# Patient Record
Sex: MALE | Race: BLACK OR AFRICAN AMERICAN | NOT HISPANIC OR LATINO | URBAN - METROPOLITAN AREA
[De-identification: names, ages, dates, MRNs, and addresses within clinical notes are randomized per-mention and may not be internally consistent; named-entity substitution may affect disease eponyms.]

---

## 2020-12-27 ENCOUNTER — INPATIENT (INPATIENT)
Facility: HOSPITAL | Age: 50
LOS: 9 days | Discharge: HOME | DRG: 871 | End: 2021-01-06
Attending: HOSPITALIST | Admitting: INTERNAL MEDICINE
Payer: MEDICAID

## 2020-12-27 VITALS
OXYGEN SATURATION: 88 % | DIASTOLIC BLOOD PRESSURE: 102 MMHG | RESPIRATION RATE: 38 BRPM | HEART RATE: 124 BPM | SYSTOLIC BLOOD PRESSURE: 140 MMHG | TEMPERATURE: 100 F

## 2020-12-27 DIAGNOSIS — R09.89 OTHER SPECIFIED SYMPTOMS AND SIGNS INVOLVING THE CIRCULATORY AND RESPIRATORY SYSTEMS: ICD-10-CM

## 2020-12-27 DIAGNOSIS — J96.01 ACUTE RESPIRATORY FAILURE WITH HYPOXIA: ICD-10-CM

## 2020-12-27 LAB
ALBUMIN SERPL ELPH-MCNC: 3.1 G/DL — LOW (ref 3.3–5.2)
ALP SERPL-CCNC: 130 U/L — HIGH (ref 40–120)
ALT FLD-CCNC: 67 U/L — HIGH
ANION GAP SERPL CALC-SCNC: 13 MMOL/L — SIGNIFICANT CHANGE UP (ref 5–17)
APTT BLD: 36.6 SEC — HIGH (ref 27.5–35.5)
AST SERPL-CCNC: 63 U/L — HIGH
BASOPHILS # BLD AUTO: 0.07 K/UL — SIGNIFICANT CHANGE UP (ref 0–0.2)
BASOPHILS NFR BLD AUTO: 0.5 % — SIGNIFICANT CHANGE UP (ref 0–2)
BILIRUB SERPL-MCNC: 1.2 MG/DL — SIGNIFICANT CHANGE UP (ref 0.4–2)
BUN SERPL-MCNC: 22 MG/DL — HIGH (ref 8–20)
CALCIUM SERPL-MCNC: 8.4 MG/DL — LOW (ref 8.6–10.2)
CHLORIDE SERPL-SCNC: 94 MMOL/L — LOW (ref 98–107)
CO2 SERPL-SCNC: 24 MMOL/L — SIGNIFICANT CHANGE UP (ref 22–29)
CREAT SERPL-MCNC: 1.23 MG/DL — SIGNIFICANT CHANGE UP (ref 0.5–1.3)
CRP SERPL-MCNC: 30.97 MG/DL — HIGH (ref 0–0.4)
D DIMER BLD IA.RAPID-MCNC: HIGH NG/ML DDU
EOSINOPHIL # BLD AUTO: 0.01 K/UL — SIGNIFICANT CHANGE UP (ref 0–0.5)
EOSINOPHIL NFR BLD AUTO: 0.1 % — SIGNIFICANT CHANGE UP (ref 0–6)
FERRITIN SERPL-MCNC: 839 NG/ML — HIGH (ref 30–400)
GLUCOSE SERPL-MCNC: 289 MG/DL — HIGH (ref 70–99)
HCT VFR BLD CALC: 40.1 % — SIGNIFICANT CHANGE UP (ref 39–50)
HGB BLD-MCNC: 13.4 G/DL — SIGNIFICANT CHANGE UP (ref 13–17)
IMM GRANULOCYTES NFR BLD AUTO: 2 % — HIGH (ref 0–1.5)
INR BLD: 1.61 RATIO — HIGH (ref 0.88–1.16)
LYMPHOCYTES # BLD AUTO: 0.98 K/UL — LOW (ref 1–3.3)
LYMPHOCYTES # BLD AUTO: 6.7 % — LOW (ref 13–44)
MCHC RBC-ENTMCNC: 30.2 PG — SIGNIFICANT CHANGE UP (ref 27–34)
MCHC RBC-ENTMCNC: 33.4 GM/DL — SIGNIFICANT CHANGE UP (ref 32–36)
MCV RBC AUTO: 90.5 FL — SIGNIFICANT CHANGE UP (ref 80–100)
MONOCYTES # BLD AUTO: 0.8 K/UL — SIGNIFICANT CHANGE UP (ref 0–0.9)
MONOCYTES NFR BLD AUTO: 5.5 % — SIGNIFICANT CHANGE UP (ref 2–14)
NEUTROPHILS # BLD AUTO: 12.37 K/UL — HIGH (ref 1.8–7.4)
NEUTROPHILS NFR BLD AUTO: 85.2 % — HIGH (ref 43–77)
PLATELET # BLD AUTO: 234 K/UL — SIGNIFICANT CHANGE UP (ref 150–400)
POTASSIUM SERPL-MCNC: 4.5 MMOL/L — SIGNIFICANT CHANGE UP (ref 3.5–5.3)
POTASSIUM SERPL-SCNC: 4.5 MMOL/L — SIGNIFICANT CHANGE UP (ref 3.5–5.3)
PROCALCITONIN SERPL-MCNC: 1.86 NG/ML — HIGH (ref 0.02–0.1)
PROT SERPL-MCNC: 7.4 G/DL — SIGNIFICANT CHANGE UP (ref 6.6–8.7)
PROTHROM AB SERPL-ACNC: 18.3 SEC — HIGH (ref 10.6–13.6)
RBC # BLD: 4.43 M/UL — SIGNIFICANT CHANGE UP (ref 4.2–5.8)
RBC # FLD: 13.5 % — SIGNIFICANT CHANGE UP (ref 10.3–14.5)
SARS-COV-2 RNA SPEC QL NAA+PROBE: DETECTED
SODIUM SERPL-SCNC: 131 MMOL/L — LOW (ref 135–145)
WBC # BLD: 14.59 K/UL — HIGH (ref 3.8–10.5)
WBC # FLD AUTO: 14.59 K/UL — HIGH (ref 3.8–10.5)

## 2020-12-27 PROCEDURE — 71045 X-RAY EXAM CHEST 1 VIEW: CPT | Mod: 26

## 2020-12-27 PROCEDURE — 93970 EXTREMITY STUDY: CPT | Mod: 26

## 2020-12-27 PROCEDURE — 99223 1ST HOSP IP/OBS HIGH 75: CPT

## 2020-12-27 PROCEDURE — 99291 CRITICAL CARE FIRST HOUR: CPT

## 2020-12-27 PROCEDURE — 93010 ELECTROCARDIOGRAM REPORT: CPT

## 2020-12-27 RX ORDER — DEXAMETHASONE 0.5 MG/5ML
6 ELIXIR ORAL DAILY
Refills: 0 | Status: COMPLETED | OUTPATIENT
Start: 2020-12-27 | End: 2021-01-06

## 2020-12-27 RX ORDER — ENOXAPARIN SODIUM 100 MG/ML
40 INJECTION SUBCUTANEOUS
Refills: 0 | Status: DISCONTINUED | OUTPATIENT
Start: 2020-12-27 | End: 2020-12-27

## 2020-12-27 RX ORDER — CEFTRIAXONE 500 MG/1
1000 INJECTION, POWDER, FOR SOLUTION INTRAMUSCULAR; INTRAVENOUS EVERY 24 HOURS
Refills: 0 | Status: DISCONTINUED | OUTPATIENT
Start: 2020-12-27 | End: 2020-12-27

## 2020-12-27 RX ORDER — DEXAMETHASONE 0.5 MG/5ML
6 ELIXIR ORAL ONCE
Refills: 0 | Status: COMPLETED | OUTPATIENT
Start: 2020-12-27 | End: 2020-12-27

## 2020-12-27 RX ORDER — REMDESIVIR 5 MG/ML
200 INJECTION INTRAVENOUS EVERY 24 HOURS
Refills: 0 | Status: COMPLETED | OUTPATIENT
Start: 2020-12-27 | End: 2020-12-27

## 2020-12-27 RX ORDER — AZITHROMYCIN 500 MG/1
500 TABLET, FILM COATED ORAL EVERY 24 HOURS
Refills: 0 | Status: DISCONTINUED | OUTPATIENT
Start: 2020-12-27 | End: 2020-12-27

## 2020-12-27 RX ORDER — ACETAMINOPHEN 500 MG
975 TABLET ORAL ONCE
Refills: 0 | Status: COMPLETED | OUTPATIENT
Start: 2020-12-27 | End: 2020-12-27

## 2020-12-27 RX ORDER — MORPHINE SULFATE 50 MG/1
2 CAPSULE, EXTENDED RELEASE ORAL ONCE
Refills: 0 | Status: DISCONTINUED | OUTPATIENT
Start: 2020-12-27 | End: 2020-12-27

## 2020-12-27 RX ORDER — REMDESIVIR 5 MG/ML
100 INJECTION INTRAVENOUS EVERY 24 HOURS
Refills: 0 | Status: COMPLETED | OUTPATIENT
Start: 2020-12-28 | End: 2020-12-31

## 2020-12-27 RX ORDER — CHLORHEXIDINE GLUCONATE 213 G/1000ML
1 SOLUTION TOPICAL
Refills: 0 | Status: DISCONTINUED | OUTPATIENT
Start: 2020-12-27 | End: 2021-01-01

## 2020-12-27 RX ORDER — ENOXAPARIN SODIUM 100 MG/ML
100 INJECTION SUBCUTANEOUS EVERY 12 HOURS
Refills: 0 | Status: DISCONTINUED | OUTPATIENT
Start: 2020-12-27 | End: 2021-01-05

## 2020-12-27 RX ORDER — REMDESIVIR 5 MG/ML
INJECTION INTRAVENOUS
Refills: 0 | Status: COMPLETED | OUTPATIENT
Start: 2020-12-27 | End: 2020-12-31

## 2020-12-27 RX ADMIN — CEFTRIAXONE 100 MILLIGRAM(S): 500 INJECTION, POWDER, FOR SOLUTION INTRAMUSCULAR; INTRAVENOUS at 17:29

## 2020-12-27 RX ADMIN — Medication 975 MILLIGRAM(S): at 14:52

## 2020-12-27 RX ADMIN — ENOXAPARIN SODIUM 40 MILLIGRAM(S): 100 INJECTION SUBCUTANEOUS at 17:29

## 2020-12-27 RX ADMIN — Medication 6 MILLIGRAM(S): at 14:52

## 2020-12-27 RX ADMIN — Medication 975 MILLIGRAM(S): at 17:29

## 2020-12-27 RX ADMIN — MORPHINE SULFATE 2 MILLIGRAM(S): 50 CAPSULE, EXTENDED RELEASE ORAL at 17:44

## 2020-12-27 RX ADMIN — REMDESIVIR 200 MILLIGRAM(S): 5 INJECTION INTRAVENOUS at 17:58

## 2020-12-27 NOTE — ED PROVIDER NOTE - CARE PLAN
Principal Discharge DX:	Acute respiratory failure with hypoxia  Secondary Diagnosis:	Multilobar lung infiltrate

## 2020-12-27 NOTE — H&P ADULT - NSHPSOCIALHISTORY_GEN_ALL_CORE
Lives with mother, takes care of her (she has dementia).    Denies ETOH, smoking, or illicit drug use.  Has one living brother with mental disability, older brother .  No wife or children.  Father .  Works as illustrator.

## 2020-12-27 NOTE — H&P ADULT - NSHPLABSRESULTS_GEN_ALL_CORE
13.4   14.59 )-----------( 234      ( 27 Dec 2020 15:01 )             40.1   12-27    131<L>  |  94<L>  |  22.0<H>  ----------------------------<  289<H>  4.5   |  24.0  |  1.23    Ca    8.4<L>      27 Dec 2020 15:01    TPro  7.4  /  Alb  3.1<L>  /  TBili  1.2  /  DBili  x   /  AST  63<H>  /  ALT  67<H>  /  AlkPhos  130<H>  12-27

## 2020-12-27 NOTE — ED PROVIDER NOTE - OBJECTIVE STATEMENT
50 year old male with no PMH presents with SOB. Pt reports that he has had 2 weeks of fatigue, muscle aches, mylagias, and cough, he states that over that time he has had progressively worsening SOB. He called EMS today, and when they arrived they found him to be saturating 68% on RA, only came to 88% on 15L NRB. he denies chest pain, LE edema, hemoptysis, abd pain, numbness, tingling, weakness.

## 2020-12-27 NOTE — ED PROVIDER NOTE - TOBACCO USE
Never smoker DISPLAY PLAN FREE TEXT DISPLAY PLAN FREE TEXT DISPLAY PLAN FREE TEXT DISPLAY PLAN FREE TEXT DISPLAY PLAN FREE TEXT DISPLAY PLAN FREE TEXT DISPLAY PLAN FREE TEXT DISPLAY PLAN FREE TEXT DISPLAY PLAN FREE TEXT DISPLAY PLAN FREE TEXT DISPLAY PLAN FREE TEXT DISPLAY PLAN FREE TEXT DISPLAY PLAN FREE TEXT DISPLAY PLAN FREE TEXT

## 2020-12-27 NOTE — H&P ADULT - NSHPPHYSICALEXAM_GEN_ALL_CORE
On bipap with 100% fio2  alert, very tachypneic, able to answer questions  diaphoretic  reg rhythm, tachy  bilat air entry, scattered rhonchi  abd obese, nontender  bilat mild edema

## 2020-12-27 NOTE — H&P ADULT - HISTORY OF PRESENT ILLNESS
49 yo obese male, without known PMH, presented to the ED with fever, shortness of breath and fatigue.  Found to be hypoxic, saturating 68% on room air, placed on BIPAP in ED.  CXR showed diffuse multifocal infiltrates.  Patient reports having symptoms for several days, worsening the day of presentation.

## 2020-12-27 NOTE — H&P ADULT - ASSESSMENT
Impression:  1) Acute respiratory failure  2) COVID19 viral pneumonia  3) Obesity  Patient saturating in mid 90s on bipap, however is still tachypneic, at high risk for requiring intubation.  Will admit to MICU.     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

## 2020-12-28 LAB
ALBUMIN SERPL ELPH-MCNC: 3.2 G/DL — LOW (ref 3.3–5.2)
ALP SERPL-CCNC: 126 U/L — HIGH (ref 40–120)
ALT FLD-CCNC: 57 U/L — HIGH
ANION GAP SERPL CALC-SCNC: 14 MMOL/L — SIGNIFICANT CHANGE UP (ref 5–17)
AST SERPL-CCNC: 32 U/L — SIGNIFICANT CHANGE UP
BILIRUB DIRECT SERPL-MCNC: 0.2 MG/DL — SIGNIFICANT CHANGE UP (ref 0–0.3)
BILIRUB INDIRECT FLD-MCNC: 0.4 MG/DL — SIGNIFICANT CHANGE UP (ref 0.2–1)
BILIRUB SERPL-MCNC: 0.7 MG/DL — SIGNIFICANT CHANGE UP (ref 0.4–2)
BUN SERPL-MCNC: 33 MG/DL — HIGH (ref 8–20)
CALCIUM SERPL-MCNC: 8.7 MG/DL — SIGNIFICANT CHANGE UP (ref 8.6–10.2)
CHLORIDE SERPL-SCNC: 102 MMOL/L — SIGNIFICANT CHANGE UP (ref 98–107)
CO2 SERPL-SCNC: 25 MMOL/L — SIGNIFICANT CHANGE UP (ref 22–29)
CREAT SERPL-MCNC: 0.93 MG/DL — SIGNIFICANT CHANGE UP (ref 0.5–1.3)
CREAT SERPL-MCNC: 0.96 MG/DL — SIGNIFICANT CHANGE UP (ref 0.5–1.3)
CRP SERPL-MCNC: 34.44 MG/DL — HIGH (ref 0–0.4)
FERRITIN SERPL-MCNC: 962 NG/ML — HIGH (ref 30–400)
GLUCOSE BLDC GLUCOMTR-MCNC: 227 MG/DL — HIGH (ref 70–99)
GLUCOSE BLDC GLUCOMTR-MCNC: 239 MG/DL — HIGH (ref 70–99)
GLUCOSE BLDC GLUCOMTR-MCNC: 312 MG/DL — HIGH (ref 70–99)
GLUCOSE BLDC GLUCOMTR-MCNC: 314 MG/DL — HIGH (ref 70–99)
GLUCOSE SERPL-MCNC: 276 MG/DL — HIGH (ref 70–99)
HCT VFR BLD CALC: 41.2 % — SIGNIFICANT CHANGE UP (ref 39–50)
HGB BLD-MCNC: 14.1 G/DL — SIGNIFICANT CHANGE UP (ref 13–17)
LEGIONELLA AG UR QL: NEGATIVE — SIGNIFICANT CHANGE UP
MAGNESIUM SERPL-MCNC: 3 MG/DL — HIGH (ref 1.6–2.6)
MCHC RBC-ENTMCNC: 30.9 PG — SIGNIFICANT CHANGE UP (ref 27–34)
MCHC RBC-ENTMCNC: 34.2 GM/DL — SIGNIFICANT CHANGE UP (ref 32–36)
MCV RBC AUTO: 90.2 FL — SIGNIFICANT CHANGE UP (ref 80–100)
PHOSPHATE SERPL-MCNC: 3.5 MG/DL — SIGNIFICANT CHANGE UP (ref 2.4–4.7)
PLATELET # BLD AUTO: 236 K/UL — SIGNIFICANT CHANGE UP (ref 150–400)
POTASSIUM SERPL-MCNC: 4.8 MMOL/L — SIGNIFICANT CHANGE UP (ref 3.5–5.3)
POTASSIUM SERPL-SCNC: 4.8 MMOL/L — SIGNIFICANT CHANGE UP (ref 3.5–5.3)
PROCALCITONIN SERPL-MCNC: 2.34 NG/ML — HIGH (ref 0.02–0.1)
PROT SERPL-MCNC: 7.7 G/DL — SIGNIFICANT CHANGE UP (ref 6.6–8.7)
RBC # BLD: 4.57 M/UL — SIGNIFICANT CHANGE UP (ref 4.2–5.8)
RBC # FLD: 13.8 % — SIGNIFICANT CHANGE UP (ref 10.3–14.5)
SARS-COV-2 IGG SERPL QL IA: POSITIVE
SARS-COV-2 IGM SERPL IA-ACNC: 7.13 INDEX — HIGH
SODIUM SERPL-SCNC: 140 MMOL/L — SIGNIFICANT CHANGE UP (ref 135–145)
WBC # BLD: 14.73 K/UL — HIGH (ref 3.8–10.5)
WBC # FLD AUTO: 14.73 K/UL — HIGH (ref 3.8–10.5)

## 2020-12-28 PROCEDURE — 71275 CT ANGIOGRAPHY CHEST: CPT | Mod: 26

## 2020-12-28 PROCEDURE — 99223 1ST HOSP IP/OBS HIGH 75: CPT

## 2020-12-28 PROCEDURE — 99233 SBSQ HOSP IP/OBS HIGH 50: CPT

## 2020-12-28 RX ORDER — INSULIN GLARGINE 100 [IU]/ML
12 INJECTION, SOLUTION SUBCUTANEOUS AT BEDTIME
Refills: 0 | Status: DISCONTINUED | OUTPATIENT
Start: 2020-12-28 | End: 2020-12-29

## 2020-12-28 RX ORDER — INSULIN LISPRO 100/ML
VIAL (ML) SUBCUTANEOUS EVERY 6 HOURS
Refills: 0 | Status: DISCONTINUED | OUTPATIENT
Start: 2020-12-28 | End: 2020-12-29

## 2020-12-28 RX ADMIN — CHLORHEXIDINE GLUCONATE 1 APPLICATION(S): 213 SOLUTION TOPICAL at 06:23

## 2020-12-28 RX ADMIN — ENOXAPARIN SODIUM 100 MILLIGRAM(S): 100 INJECTION SUBCUTANEOUS at 10:08

## 2020-12-28 RX ADMIN — REMDESIVIR 500 MILLIGRAM(S): 5 INJECTION INTRAVENOUS at 18:32

## 2020-12-28 RX ADMIN — Medication 8: at 23:21

## 2020-12-28 RX ADMIN — INSULIN GLARGINE 12 UNIT(S): 100 INJECTION, SOLUTION SUBCUTANEOUS at 23:21

## 2020-12-28 RX ADMIN — ENOXAPARIN SODIUM 100 MILLIGRAM(S): 100 INJECTION SUBCUTANEOUS at 23:21

## 2020-12-28 RX ADMIN — Medication 4 MILLIGRAM(S): at 05:24

## 2020-12-28 RX ADMIN — Medication 8: at 18:32

## 2020-12-28 NOTE — CONSULT NOTE ADULT - SUBJECTIVE AND OBJECTIVE BOX
[facility]  INFECTIOUS DISEASES AND INTERNAL MEDICINE at [place]  =======================================================  Jamie Morales MD  Diplomates American Board of Internal Medicine and Infectious Diseases  Tel: [phone]      Fax: [phone]  =======================================================      [id number]  EDSON BELLA    CC: Patient is a 50y old  Male who presents with a chief complaint of respiratory failure (27 Dec 2020 16:54)      50y  Male with no PMH who presented to the ED with fever, shortness of breath and fatigue. Found to be hypoxic, saturating 68% on room air, placed on BIPAP in ED.  CXR showed diffuse multifocal infiltrates.  Patient reports having symptoms for several days, worsening the day of presentation. Patient with unknown sick contacts. Admitted to MICU. COVID 19 positive. ID input consulted.       Past Medical & Surgical Hx:  Obesity  No significant past surgical history      Social Hx:  Denies smoking       FAMILY HISTORY:  FH: senile dementia - Mother      Allergies  No Known Allergies       REVIEW OF SYSTEMS:  CONSTITUTIONAL:  No Fever or chills  HEENT:  No diplopia or blurred vision.  No earache, sore throat or runny nose.  CARDIOVASCULAR:  No pressure, squeezing, strangling, tightness, heaviness or aching about the chest, neck, axilla or epigastrium.  RESPIRATORY:  + cough, + shortness of breath  GASTROINTESTINAL:  No nausea, vomiting or diarrhea.  GENITOURINARY:  No dysuria, frequency or urgency.   MUSCULOSKELETAL:  no joint aches, no muscle pain  SKIN:  No change in skin, hair or nails.  NEUROLOGIC:  No Headaches, seizures   PSYCHIATRIC:  No disorder of thought or mood.  ENDOCRINE:  No heat or cold intolerance  HEMATOLOGICAL:  No easy bruising or bleeding.       Physical Exam:  GEN: Moderate respiratory failure on BIPAP   HEENT: normocephalic and atraumatic. EOMI. PERRL.  Anicteric  NECK: Supple.   LUNGS: Course BS B/L  HEART: Regular rate and rhythm  ABDOMEN: Soft, nontender, and nondistended.  Positive bowel sounds.    : No CVA tenderness  EXTREMITIES: Without any edema.  MSK: No joint swelling  NEUROLOGIC:  No Focal Deficits  PSYCHIATRIC: Appropriate affect .  SKIN: No Rash    Height (cm): 182.9 (12-28 @ 09:00)  Weight (kg): 104.3 (12-28 @ 09:00)  BMI (kg/m2): 31.2 (12-28 @ 09:00)  BSA (m2): 2.26 (12-28 @ 09:00)      Vitals:  T(F): 97.7 (28 Dec 2020 11:27), Max: 99.9 (27 Dec 2020 14:32)  HR: 99 (28 Dec 2020 14:00)  BP: 146/87 (28 Dec 2020 14:00)  RR: 41 (28 Dec 2020 14:00)  SpO2: 95% (28 Dec 2020 14:00) (88% - 99%)  temp max in last 48H T(F): , Max: 99.9 (12-27-20 @ 14:32)      Current Antibiotics:  remdesivir  IVPB   IV Intermittent   remdesivir  IVPB 100 milliGRAM(s) IV Intermittent every 24 hours      Other medications:  chlorhexidine 2% Cloths 1 Application(s) Topical <User Schedule>  dexAMETHasone  Injectable 6 milliGRAM(s) IV Push daily  enoxaparin Injectable 100 milliGRAM(s) SubCutaneous every 12 hours  insulin lispro (ADMELOG) corrective regimen sliding scale.   SubCutaneous every 6 hours        Labs:             14.1   14.73 )-----------( 236      ( 28 Dec 2020 08:59 )             41.2      12-28    140  |  102  |  33.0<H>  ----------------------------<  276<H>  4.8   |  25.0  |  0.96    Ca    8.7      28 Dec 2020 08:59  Phos  3.5     12-28  Mg     3.0     12-28    TPro  7.7  /  Alb  3.2<L>  /  TBili  0.7  /  DBili  0.2  /  AST  32  /  ALT  57<H>  /  AlkPhos  126<H>  12-28      WBC Count: 14.73 K/uL (12-28-20 @ 08:59)  WBC Count: 14.59 K/uL (12-27-20 @ 15:01)    Creatinine, Serum: 0.96 mg/dL (12-28-20 @ 08:59)  Creatinine, Serum: 0.93 mg/dL (12-28-20 @ 07:19)  Creatinine, Serum: 1.23 mg/dL (12-27-20 @ 15:01)    C-Reactive Protein, Serum: 34.44 mg/dL (12-28-20 @ 07:18)  C-Reactive Protein, Serum: 30.97 mg/dL (12-27-20 @ 15:01)    Ferritin, Serum: 962 ng/mL (12-28-20 @ 07:18)  Ferritin, Serum: 839 ng/mL (12-27-20 @ 15:01)    Procalcitonin, Serum: 2.34 ng/mL (12-28-20 @ 07:18)  Procalcitonin, Serum: 1.86 ng/mL (12-27-20 @ 15:01)     COVID-19 PCR: Detected (12-27-20 @ 15:50)        < from: Xray Chest 1 View- PORTABLE-Urgent (12.27.20 @ 16:04) >  EXAM:  XR CHEST PORTABLE URGENT 1V                          PROCEDURE DATE:  12/27/2020      INTERPRETATION:  TECHNIQUE: Single portable view of the chest.    COMPARISON: 9/29/2006.    CLINICAL HISTORY: Shortness of Breath, Cough,  Fever    FINDINGS:    Single frontal view of the chest demonstrates diffuse bilateral infiltrates. The cardiomediastinal silhouette is enlarged. No acute osseous abnormalities. Overlying EKG leads and wires are noted    IMPRESSION: Multilobar pneumonia.    < end of copied text >

## 2020-12-28 NOTE — CONSULT NOTE ADULT - ASSESSMENT
50y  Male with no PMH who presented to the ED with fever, shortness of breath and fatigue. Found to be hypoxic, saturating 68% on room air, placed on BIPAP in ED.  CXR showed diffuse multifocal infiltrates.  Patient reports having symptoms for several days, worsening the day of presentation. Patient with unknown sick contacts. Admitted to MICU. COVID 19 positive.      Acute Hypoxic respiratory failure  COVID-19 infection  B/L Pneumonia   cough  fevers  shortness of breath      - COVID 19 PCR positive   - CXR reporting multifocal pneumonia   - Continue supportive care measures  - continue to trend inflammatory markers.   - trend CBC with diff, CMP,  CRP, Ferritin,  procalcitonin q 48hours  - Avoid antibiotics unless there is a concern for a bacterial infection  - May consider vitamin C, thiamine and zinc (note lack of evidence to support benefit with COVID 19)  - Discussed Remdesivir with the patient.   - Start Remdesivir 200mg IV x1 followed by 100mg IV daily   - Dexamethasone 6mg  Daily   - Patient needs to quarantine  for 14 days after discharge   - follow up all outstanding cultures  - Trend Fever  - Trend Leukocytosis      Will follow

## 2020-12-28 NOTE — PROGRESS NOTE ADULT - SUBJECTIVE AND OBJECTIVE BOX
Patient is a 50y old  Male who presents with a chief complaint of respiratory failure (28 Dec 2020 14:23)      BRIEF HOSPITAL COURSE:   51 yo obese male, without known PMH, presented to the ED with fever, shortness of breath and fatigue.  Found to be hypoxic, saturating 68% on room air, placed on BIPAP in ED, f/w COVID-19 PNA, and was admitted to the ICU for further management. On LE doppler found with slow blood flow. Empirically started on FD Lovenox, CTA pending.    Events last 24 hours: Remains on BPAP. CTA Pending.    PAST MEDICAL & SURGICAL HISTORY:  Obesity    No significant past surgical history          Medications:  remdesivir  IVPB   IV Intermittent   remdesivir  IVPB 100 milliGRAM(s) IV Intermittent every 24 hours            enoxaparin Injectable 100 milliGRAM(s) SubCutaneous every 12 hours        dexAMETHasone  Injectable 6 milliGRAM(s) IV Push daily  insulin lispro (ADMELOG) corrective regimen sliding scale.   SubCutaneous every 6 hours        chlorhexidine 2% Cloths 1 Application(s) Topical <User Schedule>            ICU Vital Signs Last 24 Hrs  T(C): 36.5 (28 Dec 2020 11:27), Max: 36.8 (28 Dec 2020 04:00)  T(F): 97.7 (28 Dec 2020 11:27), Max: 98.2 (28 Dec 2020 04:00)  HR: 99 (28 Dec 2020 14:00) (79 - 104)  BP: 146/87 (28 Dec 2020 14:00) (112/86 - 146/87)  BP(mean): 101 (28 Dec 2020 14:00) (88 - 106)  ABP: --  ABP(mean): --  RR: 41 (28 Dec 2020 14:00) (26 - 41)  SpO2: 95% (28 Dec 2020 14:00) (89% - 99%)          I&O's Detail    27 Dec 2020 07:01  -  28 Dec 2020 07:00  --------------------------------------------------------  IN:    IV PiggyBack: 250 mL  Total IN: 250 mL    OUT:    Oral Fluid: 0 mL    Voided (mL): 125 mL  Total OUT: 125 mL    Total NET: 125 mL      28 Dec 2020 07:01  -  28 Dec 2020 14:53  --------------------------------------------------------  IN:  Total IN: 0 mL    OUT:    Voided (mL): 750 mL  Total OUT: 750 mL    Total NET: -750 mL            LABS:                        14.1   14.73 )-----------( 236      ( 28 Dec 2020 08:59 )             41.2     12-28    140  |  102  |  33.0<H>  ----------------------------<  276<H>  4.8   |  25.0  |  0.96    Ca    8.7      28 Dec 2020 08:59  Phos  3.5     12-28  Mg     3.0     12-28    TPro  7.7  /  Alb  3.2<L>  /  TBili  0.7  /  DBili  0.2  /  AST  32  /  ALT  57<H>  /  AlkPhos  126<H>  12-28          CAPILLARY BLOOD GLUCOSE      POCT Blood Glucose.: 227 mg/dL (28 Dec 2020 05:59)    PT/INR - ( 27 Dec 2020 15:01 )   PT: 18.3 sec;   INR: 1.61 ratio         PTT - ( 27 Dec 2020 15:01 )  PTT:36.6 sec    CULTURES:      Physical Examination:  GENERAL: In NAD, on BPAP  HEENT: NC  NECK: Supple  PULM: Coarse mechanical breath sounds B/L  CVS: +S1, S2  ABD: Soft, NT/ND  EXT: No pedal edema  SKIN: Warm and well perfused, no rashes noted.  NEURO: Grossly non-focal    DEVICES:     RADIOLOGY:   < from: Xray Chest 1 View- PORTABLE-Urgent (12.27.20 @ 16:04) >  INTERPRETATION:  TECHNIQUE: Single portable view of the chest.    COMPARISON: 9/29/2006.    CLINICAL HISTORY: Shortness of Breath, Cough,  Fever    FINDINGS:    Single frontal view of the chest demonstrates diffuse bilateral infiltrates. The cardiomediastinal silhouette is enlarged. No acute osseous abnormalities. Overlying EKG leads and wires are noted    IMPRESSION: Multilobar pneumonia.    < end of copied text >

## 2020-12-29 LAB
ALBUMIN SERPL ELPH-MCNC: 3 G/DL — LOW (ref 3.3–5.2)
ALBUMIN SERPL ELPH-MCNC: 3 G/DL — LOW (ref 3.3–5.2)
ALP SERPL-CCNC: 115 U/L — SIGNIFICANT CHANGE UP (ref 40–120)
ALP SERPL-CCNC: 117 U/L — SIGNIFICANT CHANGE UP (ref 40–120)
ALT FLD-CCNC: 43 U/L — HIGH
ALT FLD-CCNC: 43 U/L — HIGH
ANION GAP SERPL CALC-SCNC: 9 MMOL/L — SIGNIFICANT CHANGE UP (ref 5–17)
ANISOCYTOSIS BLD QL: SLIGHT — SIGNIFICANT CHANGE UP
AST SERPL-CCNC: 19 U/L — SIGNIFICANT CHANGE UP
AST SERPL-CCNC: 20 U/L — SIGNIFICANT CHANGE UP
BASOPHILS # BLD AUTO: 0 K/UL — SIGNIFICANT CHANGE UP (ref 0–0.2)
BASOPHILS NFR BLD AUTO: 0 % — SIGNIFICANT CHANGE UP (ref 0–2)
BILIRUB DIRECT SERPL-MCNC: 0.2 MG/DL — SIGNIFICANT CHANGE UP (ref 0–0.3)
BILIRUB INDIRECT FLD-MCNC: 0.3 MG/DL — SIGNIFICANT CHANGE UP (ref 0.2–1)
BILIRUB SERPL-MCNC: 0.4 MG/DL — SIGNIFICANT CHANGE UP (ref 0.4–2)
BILIRUB SERPL-MCNC: 0.5 MG/DL — SIGNIFICANT CHANGE UP (ref 0.4–2)
BUN SERPL-MCNC: 41 MG/DL — HIGH (ref 8–20)
BURR CELLS BLD QL SMEAR: PRESENT — SIGNIFICANT CHANGE UP
CALCIUM SERPL-MCNC: 8.9 MG/DL — SIGNIFICANT CHANGE UP (ref 8.6–10.2)
CHLORIDE SERPL-SCNC: 106 MMOL/L — SIGNIFICANT CHANGE UP (ref 98–107)
CO2 SERPL-SCNC: 29 MMOL/L — SIGNIFICANT CHANGE UP (ref 22–29)
CREAT SERPL-MCNC: 0.99 MG/DL — SIGNIFICANT CHANGE UP (ref 0.5–1.3)
CREAT SERPL-MCNC: 1 MG/DL — SIGNIFICANT CHANGE UP (ref 0.5–1.3)
ELLIPTOCYTES BLD QL SMEAR: SLIGHT — SIGNIFICANT CHANGE UP
EOSINOPHIL # BLD AUTO: 0 K/UL — SIGNIFICANT CHANGE UP (ref 0–0.5)
EOSINOPHIL NFR BLD AUTO: 0 % — SIGNIFICANT CHANGE UP (ref 0–6)
GIANT PLATELETS BLD QL SMEAR: PRESENT — SIGNIFICANT CHANGE UP
GLUCOSE BLDC GLUCOMTR-MCNC: 211 MG/DL — HIGH (ref 70–99)
GLUCOSE BLDC GLUCOMTR-MCNC: 239 MG/DL — HIGH (ref 70–99)
GLUCOSE BLDC GLUCOMTR-MCNC: 243 MG/DL — HIGH (ref 70–99)
GLUCOSE BLDC GLUCOMTR-MCNC: 268 MG/DL — HIGH (ref 70–99)
GLUCOSE SERPL-MCNC: 270 MG/DL — HIGH (ref 70–99)
HCT VFR BLD CALC: 41.1 % — SIGNIFICANT CHANGE UP (ref 39–50)
HGB BLD-MCNC: 13.7 G/DL — SIGNIFICANT CHANGE UP (ref 13–17)
LYMPHOCYTES # BLD AUTO: 0.85 K/UL — LOW (ref 1–3.3)
LYMPHOCYTES # BLD AUTO: 5.3 % — LOW (ref 13–44)
MACROCYTES BLD QL: SLIGHT — SIGNIFICANT CHANGE UP
MAGNESIUM SERPL-MCNC: 3.1 MG/DL — HIGH (ref 1.6–2.6)
MANUAL SMEAR VERIFICATION: SIGNIFICANT CHANGE UP
MCHC RBC-ENTMCNC: 30.7 PG — SIGNIFICANT CHANGE UP (ref 27–34)
MCHC RBC-ENTMCNC: 33.3 GM/DL — SIGNIFICANT CHANGE UP (ref 32–36)
MCV RBC AUTO: 92.2 FL — SIGNIFICANT CHANGE UP (ref 80–100)
MICROCYTES BLD QL: SLIGHT — SIGNIFICANT CHANGE UP
MONOCYTES # BLD AUTO: 0.85 K/UL — SIGNIFICANT CHANGE UP (ref 0–0.9)
MONOCYTES NFR BLD AUTO: 5.3 % — SIGNIFICANT CHANGE UP (ref 2–14)
MYELOCYTES NFR BLD: 0.9 % — HIGH (ref 0–0)
NEUTROPHILS # BLD AUTO: 14.15 K/UL — HIGH (ref 1.8–7.4)
NEUTROPHILS NFR BLD AUTO: 88.5 % — HIGH (ref 43–77)
NT-PROBNP SERPL-SCNC: 47 PG/ML — SIGNIFICANT CHANGE UP (ref 0–300)
OVALOCYTES BLD QL SMEAR: SLIGHT — SIGNIFICANT CHANGE UP
PHOSPHATE SERPL-MCNC: 4 MG/DL — SIGNIFICANT CHANGE UP (ref 2.4–4.7)
PLAT MORPH BLD: NORMAL — SIGNIFICANT CHANGE UP
PLATELET # BLD AUTO: 316 K/UL — SIGNIFICANT CHANGE UP (ref 150–400)
POIKILOCYTOSIS BLD QL AUTO: SLIGHT — SIGNIFICANT CHANGE UP
POLYCHROMASIA BLD QL SMEAR: SLIGHT — SIGNIFICANT CHANGE UP
POTASSIUM SERPL-MCNC: 4.6 MMOL/L — SIGNIFICANT CHANGE UP (ref 3.5–5.3)
POTASSIUM SERPL-SCNC: 4.6 MMOL/L — SIGNIFICANT CHANGE UP (ref 3.5–5.3)
PROT SERPL-MCNC: 7 G/DL — SIGNIFICANT CHANGE UP (ref 6.6–8.7)
PROT SERPL-MCNC: 7.1 G/DL — SIGNIFICANT CHANGE UP (ref 6.6–8.7)
RBC # BLD: 4.46 M/UL — SIGNIFICANT CHANGE UP (ref 4.2–5.8)
RBC # FLD: 13.9 % — SIGNIFICANT CHANGE UP (ref 10.3–14.5)
RBC BLD AUTO: ABNORMAL
SCHISTOCYTES BLD QL AUTO: SLIGHT — SIGNIFICANT CHANGE UP
SODIUM SERPL-SCNC: 144 MMOL/L — SIGNIFICANT CHANGE UP (ref 135–145)
TROPONIN T SERPL-MCNC: <0.01 NG/ML — SIGNIFICANT CHANGE UP (ref 0–0.06)
WBC # BLD: 15.99 K/UL — HIGH (ref 3.8–10.5)
WBC # FLD AUTO: 15.99 K/UL — HIGH (ref 3.8–10.5)

## 2020-12-29 PROCEDURE — 99232 SBSQ HOSP IP/OBS MODERATE 35: CPT

## 2020-12-29 PROCEDURE — 99233 SBSQ HOSP IP/OBS HIGH 50: CPT

## 2020-12-29 PROCEDURE — 99223 1ST HOSP IP/OBS HIGH 75: CPT

## 2020-12-29 RX ORDER — MORPHINE SULFATE 50 MG/1
1 CAPSULE, EXTENDED RELEASE ORAL EVERY 4 HOURS
Refills: 0 | Status: DISCONTINUED | OUTPATIENT
Start: 2020-12-29 | End: 2020-12-29

## 2020-12-29 RX ORDER — INSULIN LISPRO 100/ML
VIAL (ML) SUBCUTANEOUS
Refills: 0 | Status: DISCONTINUED | OUTPATIENT
Start: 2020-12-29 | End: 2021-01-06

## 2020-12-29 RX ORDER — INSULIN GLARGINE 100 [IU]/ML
20 INJECTION, SOLUTION SUBCUTANEOUS AT BEDTIME
Refills: 0 | Status: DISCONTINUED | OUTPATIENT
Start: 2020-12-29 | End: 2021-01-06

## 2020-12-29 RX ORDER — MORPHINE SULFATE 50 MG/1
1 CAPSULE, EXTENDED RELEASE ORAL ONCE
Refills: 0 | Status: DISCONTINUED | OUTPATIENT
Start: 2020-12-29 | End: 2020-12-29

## 2020-12-29 RX ORDER — SODIUM CHLORIDE 9 MG/ML
1000 INJECTION, SOLUTION INTRAVENOUS
Refills: 0 | Status: DISCONTINUED | OUTPATIENT
Start: 2020-12-29 | End: 2021-01-03

## 2020-12-29 RX ADMIN — INSULIN GLARGINE 20 UNIT(S): 100 INJECTION, SOLUTION SUBCUTANEOUS at 22:29

## 2020-12-29 RX ADMIN — MORPHINE SULFATE 1 MILLIGRAM(S): 50 CAPSULE, EXTENDED RELEASE ORAL at 05:55

## 2020-12-29 RX ADMIN — SODIUM CHLORIDE 75 MILLILITER(S): 9 INJECTION, SOLUTION INTRAVENOUS at 03:00

## 2020-12-29 RX ADMIN — Medication 4: at 05:40

## 2020-12-29 RX ADMIN — Medication 4: at 11:18

## 2020-12-29 RX ADMIN — CHLORHEXIDINE GLUCONATE 1 APPLICATION(S): 213 SOLUTION TOPICAL at 05:35

## 2020-12-29 RX ADMIN — MORPHINE SULFATE 1 MILLIGRAM(S): 50 CAPSULE, EXTENDED RELEASE ORAL at 03:15

## 2020-12-29 RX ADMIN — Medication 6 MILLIGRAM(S): at 05:59

## 2020-12-29 RX ADMIN — MORPHINE SULFATE 1 MILLIGRAM(S): 50 CAPSULE, EXTENDED RELEASE ORAL at 03:00

## 2020-12-29 RX ADMIN — ENOXAPARIN SODIUM 100 MILLIGRAM(S): 100 INJECTION SUBCUTANEOUS at 22:22

## 2020-12-29 RX ADMIN — MORPHINE SULFATE 1 MILLIGRAM(S): 50 CAPSULE, EXTENDED RELEASE ORAL at 05:40

## 2020-12-29 RX ADMIN — REMDESIVIR 500 MILLIGRAM(S): 5 INJECTION INTRAVENOUS at 16:41

## 2020-12-29 RX ADMIN — Medication 6: at 16:48

## 2020-12-29 RX ADMIN — ENOXAPARIN SODIUM 100 MILLIGRAM(S): 100 INJECTION SUBCUTANEOUS at 09:57

## 2020-12-29 NOTE — PROGRESS NOTE ADULT - SUBJECTIVE AND OBJECTIVE BOX
[facility]  INFECTIOUS DISEASES AND INTERNAL MEDICINE at [place]  =======================================================  Jamie Morales MD  Diplomates American Board of Internal Medicine and Infectious Diseases  Tel: [phone]      Fax: [phone]  =======================================================    EDSON BELLA [id number]    Follow up: COVID 19     On HFNC  Off of BIPAP      Allergies:  No Known Allergies      REVIEW OF SYSTEMS:  CONSTITUTIONAL:  No Fever or chills  HEENT:  No diplopia or blurred vision.  No earache, sore throat or runny nose.  CARDIOVASCULAR:  No pressure, squeezing, strangling, tightness, heaviness or aching about the chest, neck, axilla or epigastrium.  RESPIRATORY:  No cough, + shortness of breath  GASTROINTESTINAL:  No nausea, vomiting or diarrhea.  GENITOURINARY:  No dysuria, frequency or urgency.   MUSCULOSKELETAL:  no joint aches, no muscle pain  SKIN:  No change in skin, hair or nails.  NEUROLOGIC:  No Headaches, seizures   PSYCHIATRIC:  No disorder of thought or mood.  ENDOCRINE:  No heat or cold intolerance  HEMATOLOGICAL:  No easy bruising or bleeding.       Physical Exam:  GEN: mild respiratory failure   HEENT: normocephalic and atraumatic. EOMI. PERRL.  Anicteric  NECK: Supple.   LUNGS: Course BS B/L  HEART: Regular rate and rhythm  ABDOMEN: Soft, nontender, and nondistended.  Positive bowel sounds.    : No CVA tenderness  EXTREMITIES: Without any edema.  MSK: No joint swelling  NEUROLOGIC:  No Focal Deficits  PSYCHIATRIC: Appropriate affect .  SKIN: No Rash      Vitals:    T(F): 98.8 (29 Dec 2020 15:00), Max: 98.8 (29 Dec 2020 15:00)  HR: 99 (29 Dec 2020 15:00)  BP: 126/76 (29 Dec 2020 15:00)  RR: 21 (29 Dec 2020 15:00)  SpO2: 95% (29 Dec 2020 15:00) (93% - 98%)  temp max in last 48H T(F): , Max: 98.8 (12-29-20 @ 15:00)      Current Antibiotics:  remdesivir  IVPB   IV Intermittent   remdesivir  IVPB 100 milliGRAM(s) IV Intermittent every 24 hours    Other medications:  chlorhexidine 2% Cloths 1 Application(s) Topical <User Schedule>  dexAMETHasone  Injectable 6 milliGRAM(s) IV Push daily  enoxaparin Injectable 100 milliGRAM(s) SubCutaneous every 12 hours  insulin glargine Injectable (LANTUS) 20 Unit(s) SubCutaneous at bedtime  insulin lispro (ADMELOG) corrective regimen sliding scale   SubCutaneous three times a day before meals  multiple electrolytes Injection Type 1 1000 milliLiter(s) IV Continuous <Continuous>        Labs:                        13.7   15.99 )-----------( 316      ( 29 Dec 2020 05:12 )             41.1      12-29    144  |  106  |  41.0<H>  ----------------------------<  270<H>  4.6   |  29.0  |  0.99    Ca    8.9      29 Dec 2020 05:12  Phos  4.0     12-29  Mg     3.1     12-29    TPro  7.1  /  Alb  3.0<L>  /  TBili  0.4  /  DBili  0.2  /  AST  19  /  ALT  43<H>  /  AlkPhos  115  12-29      WBC Count: 15.99 K/uL (12-29-20 @ 05:12)  WBC Count: 14.73 K/uL (12-28-20 @ 08:59)  WBC Count: 14.59 K/uL (12-27-20 @ 15:01)    Creatinine, Serum: 0.99 mg/dL (12-29-20 @ 05:12)  Creatinine, Serum: 1.00 mg/dL (12-29-20 @ 05:12)  Creatinine, Serum: 0.96 mg/dL (12-28-20 @ 08:59)  Creatinine, Serum: 0.93 mg/dL (12-28-20 @ 07:19)  Creatinine, Serum: 1.23 mg/dL (12-27-20 @ 15:01)    C-Reactive Protein, Serum: 34.44 mg/dL (12-28-20 @ 07:18)  C-Reactive Protein, Serum: 30.97 mg/dL (12-27-20 @ 15:01)    Ferritin, Serum: 962 ng/mL (12-28-20 @ 07:18)  Ferritin, Serum: 839 ng/mL (12-27-20 @ 15:01)      Procalcitonin, Serum: 2.34 ng/mL (12-28-20 @ 07:18)  Procalcitonin, Serum: 1.86 ng/mL (12-27-20 @ 15:01)       COVID-19 IgG Antibody Interpretation: Positive (12-28-20 @ 12:13)  COVID-19 IgG Antibody Index: 7.13 Index (12-28-20 @ 12:13)  COVID-19 PCR: Detected (12-27-20 @ 15:50)      < from: CT Angio Chest w/ IV Cont (12.28.20 @ 21:28) >  EXAM:  CT ANGIO CHEST (W)AW IC                          PROCEDURE DATE:  12/28/2020          INTERPRETATION:  CLINICAL INFORMATION: +COVID-19, shortness of breath, high d-dimer, assess PE.    PROCEDURE: CT angiography of the chest was performed with intravenous contrast utilizing dedicated PE protocol. 64 mls of Omnipaque-350 administered without complication. Coronal and sagittal reconstruction images were obtained. Axial MIP images were obtained from a separate workstation.    COMPARISON: None.    FINDINGS: The patient's respiratory motion degrades images.    LUNGS AND AIRWAYS: Patent central airways. Predominantly peripheral groundglass/patchy opacities in both lungs.  PLEURA: No pleural effusion or pneumothorax.  HEART: Borderline enlarged heart. No pericardial effusion.  VESSELS: Emboli in the segmental/subsegmental pulmonary arteries of bilateral lower lobes and right upper lobe and segmental pulmonary artery of the right middle lobe. Question artifact versus emboli in the segmental pulmonary artery of the left upper lobe (5:60). Mild main pulmonary artery enlargement (3.2 cm), which can be seen in pulmonary arterial hypertension.  Normal caliber of the thoracic aorta. Bovine arch. Question 0.7 x 0.7 cm aneurysmal dilatation along the posterior aspect of the proximal brachiocephalic artery/proximal left common carotid artery.  MEDIASTINUM AND JONATHON: Subcentimeter mediastinal lymph nodes without lymphadenopathy.  CHEST WALL AND LOWER NECK: Mild bilateral gynecomastia.  UPPER ABDOMEN: Hepatic cysts measuring up to 1.4 x 2.0 cm. Subcentimeter hypodense foci in the liver, too small to characterize. Layering of density in the gallbladder, which may represent sludge/stones. Nonspecific bilateral perinephric stranding.  BONES: Degenerative changes of the spine.    IMPRESSION:    Bilateral peripheral pulmonary emboli as described.    Commonly reported imaging features of COVID-19 pneumonia.    Additional findings as described.    < end of copied text >

## 2020-12-29 NOTE — DIETITIAN INITIAL EVALUATION ADULT. - ADD RECOMMEND
Rx: MVI, vitamin C, and vitamin D supplementation as feasible. Obtain daily weights to monitor trends.

## 2020-12-29 NOTE — PROGRESS NOTE ADULT - SUBJECTIVE AND OBJECTIVE BOX
On Admission  12-27-20 (2d)  HPI:  51 yo obese male, without known PMH, presented to the ED with fever, shortness of breath and fatigue.  Found to be hypoxic, saturating 68% on room air, placed on BIPAP in ED.  CXR showed diffuse multifocal infiltrates.  Patient reports having symptoms for several days, worsening the day of presentation.  (27 Dec 2020 16:54)    PAST MEDICAL & SURGICAL HISTORY:  Obesity    No significant past surgical history        Antimicrobial:  remdesivir  IVPB   IV Intermittent   remdesivir  IVPB 100 milliGRAM(s) IV Intermittent every 24 hours    Cardiovascular:    Pulmonary:    Hematalogic:  enoxaparin Injectable 100 milliGRAM(s) SubCutaneous every 12 hours    Other:  chlorhexidine 2% Cloths 1 Application(s) Topical <User Schedule>  dexAMETHasone  Injectable 6 milliGRAM(s) IV Push daily  insulin glargine Injectable (LANTUS) 12 Unit(s) SubCutaneous at bedtime  insulin lispro (ADMELOG) corrective regimen sliding scale.   SubCutaneous every 6 hours  morphine  - Injectable 1 milliGRAM(s) IV Push every 4 hours PRN  multiple electrolytes Injection Type 1 1000 milliLiter(s) IV Continuous <Continuous>      Drug Dosing Weight  Height (cm): 182.9 (28 Dec 2020 09:00)  Weight (kg): 104.3 (28 Dec 2020 09:00)  BMI (kg/m2): 31.2 (28 Dec 2020 09:00)  BSA (m2): 2.26 (28 Dec 2020 09:00)    T(C): 36.7 (12-29-20 @ 12:00), Max: 37 (12-28-20 @ 23:00)  HR: 89 (12-29-20 @ 12:00)  BP: 129/89 (12-29-20 @ 12:00)  BP(mean): 97 (12-29-20 @ 07:00)  ABP: --  ABP(mean): --  RR: 20 (12-29-20 @ 12:00)  SpO2: 95% (12-29-20 @ 12:00)          12-28 @ 07:01  -  12-29 @ 07:00  --------------------------------------------------------  IN: 375 mL / OUT: 1500 mL / NET: -1125 mL              LABS:  CBC Full  -  ( 29 Dec 2020 05:12 )  WBC Count : 15.99 K/uL  RBC Count : 4.46 M/uL  Hemoglobin : 13.7 g/dL  Hematocrit : 41.1 %  Platelet Count - Automated : 316 K/uL  Mean Cell Volume : 92.2 fl  Mean Cell Hemoglobin : 30.7 pg  Mean Cell Hemoglobin Concentration : 33.3 gm/dL  Auto Neutrophil # : 14.15 K/uL  Auto Lymphocyte # : 0.85 K/uL  Auto Monocyte # : 0.85 K/uL  Auto Eosinophil # : 0.00 K/uL  Auto Basophil # : 0.00 K/uL  Auto Neutrophil % : 88.5 %  Auto Lymphocyte % : 5.3 %  Auto Monocyte % : 5.3 %  Auto Eosinophil % : 0.0 %  Auto Basophil % : 0.0 %    12-29    144  |  106  |  41.0<H>  ----------------------------<  270<H>  4.6   |  29.0  |  0.99    Ca    8.9      29 Dec 2020 05:12  Phos  4.0     12-29  Mg     3.1     12-29    TPro  7.1  /  Alb  3.0<L>  /  TBili  0.4  /  DBili  0.2  /  AST  19  /  ALT  43<H>  /  AlkPhos  115  12-29    PT/INR - ( 27 Dec 2020 15:01 )   PT: 18.3 sec;   INR: 1.61 ratio         PTT - ( 27 Dec 2020 15:01 )  PTT:36.6 sec      ____________________________________________________________________________________________________

## 2020-12-29 NOTE — DIETITIAN INITIAL EVALUATION ADULT. - OTHER INFO
50 year old obese male, without known PMH, presented to the ED with fever, shortness of breath and fatigue, found with COVID-19 PNA requiring NIPPV. Pt on COVID isolation precautions. NPO at this time due to BiPAP. Noted with elevated blood sugars, receiving steroid tx- suggest to obtain HgA1c. RD to follow up.

## 2020-12-29 NOTE — DIETITIAN INITIAL EVALUATION ADULT. - PERTINENT MEDS FT
MEDICATIONS  (STANDING):  chlorhexidine 2% Cloths 1 Application(s) Topical <User Schedule>  dexAMETHasone  Injectable 6 milliGRAM(s) IV Push daily  enoxaparin Injectable 100 milliGRAM(s) SubCutaneous every 12 hours  insulin glargine Injectable (LANTUS) 12 Unit(s) SubCutaneous at bedtime  insulin lispro (ADMELOG) corrective regimen sliding scale.   SubCutaneous every 6 hours  multiple electrolytes Injection Type 1 1000 milliLiter(s) (75 mL/Hr) IV Continuous <Continuous>  remdesivir  IVPB   IV Intermittent   remdesivir  IVPB 100 milliGRAM(s) IV Intermittent every 24 hours    MEDICATIONS  (PRN):  morphine  - Injectable 1 milliGRAM(s) IV Push every 4 hours PRN Severe Pain (7 - 10)

## 2020-12-29 NOTE — CHART NOTE - NSCHARTNOTEFT_GEN_A_CORE
CTA noted b/l PE, no RHS  HD stable, decreasing Fio2 requirements this evening  d/w eICU, hold off on lysis at this time in setting of above  On full dose Lovenox

## 2020-12-29 NOTE — PROGRESS NOTE ADULT - SUBJECTIVE AND OBJECTIVE BOX
CC: SOB      HPI:  49 yo obese male, without known PMH, presented to the ED with fever, shortness of breath and fatigue.  Found to be hypoxic, saturating 68% on room air, placed on BIPAP in ED.  CXR showed diffuse multifocal infiltrates.  Patient reports having symptoms for several days, worsening the day of presentation.  Admitted to MICU for COVID 19 PNA and CT showed B/L PE. Pt on full dose anticoagulation and remedesivir. Pt on High flow oxygen. Pt downgraded to medical service.    Pt in bed on high flow, Pt has some SOB, no chest pain, no pain, comfortable        PAST MEDICAL & SURGICAL HISTORY:  Obesity  No past surgical history      Social History:  Tabacco - Denies  ETOH - Denies  Illicit drug abuse - denies    FAMILY HISTORY:  FH: senile dementia      Allergies  No Known Allergies      REVIEW OF SYSTEMS:  All other ROS are negative except noted in HPI    MEDICATIONS  (STANDING):  chlorhexidine 2% Cloths 1 Application(s) Topical <User Schedule>  dexAMETHasone  Injectable 6 milliGRAM(s) IV Push daily  enoxaparin Injectable 100 milliGRAM(s) SubCutaneous every 12 hours  insulin glargine Injectable (LANTUS) 20 Unit(s) SubCutaneous at bedtime  insulin lispro (ADMELOG) corrective regimen sliding scale   SubCutaneous three times a day before meals  multiple electrolytes Injection Type 1 1000 milliLiter(s) (75 mL/Hr) IV Continuous <Continuous>  remdesivir  IVPB   IV Intermittent   remdesivir  IVPB 100 milliGRAM(s) IV Intermittent every 24 hours    MEDICATIONS  (PRN):  morphine  - Injectable 1 milliGRAM(s) IV Push every 4 hours PRN Severe Pain (7 - 10)      Vital Signs Last 24 Hrs  T(C): 36.7 (29 Dec 2020 12:00), Max: 37 (28 Dec 2020 23:00)  T(F): 98.1 (29 Dec 2020 12:00), Max: 98.6 (28 Dec 2020 23:00)  HR: 95 (29 Dec 2020 13:00) (79 - 103)  BP: 132/85 (29 Dec 2020 13:00) (129/89 - 167/104)  BP(mean): 97 (29 Dec 2020 07:00) (93 - 121)  RR: 20 (29 Dec 2020 13:00) (20 - 44)  SpO2: 95% (29 Dec 2020 13:00) (93% - 98%)    PHYSICAL EXAM:  GENERAL: NAD, well-groomed, well-developed  HEAD:  Atraumatic, Normocephalic  EYES: EOMI, PERRLA, conjunctiva and sclera clear  NERVOUS SYSTEM:  Alert & Oriented X3, Good concentration; Motor Strength 5/5 B/L upper and lower extremities  CHEST/LUNG:  b/l rhonchi and rhales  HEART: Regular rate and rhythm; No murmurs  ABDOMEN: Soft, Nontender, Nondistended; Bowel sounds present  EXTREMITIES:  2+ Peripheral Pulses, No clubbing, cyanosis, or edema ,         LABS:                        13.7   15.99 )-----------( 316      ( 29 Dec 2020 05:12 )             41.1     12-29    144  |  106  |  41.0<H>  ----------------------------<  270<H>  4.6   |  29.0  |  0.99    Ca    8.9      29 Dec 2020 05:12  Phos  4.0     12-29  Mg     3.1     12-29    TPro  7.1  /  Alb  3.0<L>  /  TBili  0.4  /  DBili  0.2  /  AST  19  /  ALT  43<H>  /  AlkPhos  115  12-29    PT/INR - ( 27 Dec 2020 15:01 )   PT: 18.3 sec;   INR: 1.61 ratio         PTT - ( 27 Dec 2020 15:01 )  PTT:36.6 sec      < from: CT Angio Chest w/ IV Cont (12.28.20 @ 21:28) >   EXAM:  CT ANGIO CHEST (W)AW IC                          PROCEDURE DATE:  12/28/2020          INTERPRETATION:  CLINICAL INFORMATION: +COVID-19, shortness of breath, high d-dimer, assess PE.    PROCEDURE: CT angiography of the chest was performed with intravenous contrast utilizing dedicated PE protocol. 64 mls of Omnipaque-350 administered without complication. Coronal and sagittal reconstruction images were obtained. Axial MIP images were obtained from a separate workstation.    COMPARISON: None.    FINDINGS: The patient's respiratory motion degrades images.    LUNGS AND AIRWAYS: Patent central airways. Predominantly peripheral groundglass/patchy opacities in both lungs.  PLEURA: No pleural effusion or pneumothorax.  HEART: Borderline enlarged heart. No pericardial effusion.  VESSELS: Emboli in the segmental/subsegmental pulmonary arteries of bilateral lower lobes and right upper lobe and segmental pulmonary artery of the right middle lobe. Question artifact versus emboli in the segmental pulmonary artery of the left upper lobe (5:60). Mild main pulmonary artery enlargement (3.2 cm), which can be seen in pulmonary arterial hypertension.  Normal caliber of the thoracic aorta. Bovine arch. Question 0.7 x 0.7 cm aneurysmal dilatation along the posterior aspect of the proximal brachiocephalic artery/proximal left common carotid artery.  MEDIASTINUM AND JONATHON: Subcentimeter mediastinal lymph nodes without lymphadenopathy.  CHEST WALL AND LOWER NECK: Mild bilateral gynecomastia.  UPPER ABDOMEN: Hepatic cysts measuring up to 1.4 x 2.0 cm. Subcentimeter hypodense foci in the liver, too small to characterize. Layering of density in the gallbladder, which may represent sludge/stones. Nonspecific bilateral perinephric stranding.  BONES: Degenerative changes of the spine.    IMPRESSION:    Bilateral peripheral pulmonary emboli as described.    Commonly reported imaging features of COVID-19 pneumonia.    Additional findings as described.    Discussed with YARI He.      SHAW ZAVALA MD; Attending Radiologist  This document has been electronically signed. Dec 29 2020  1:38AM    < end of copied text >

## 2020-12-29 NOTE — DIETITIAN INITIAL EVALUATION ADULT. - PERTINENT LABORATORY DATA
12-29 Na144 mmol/L Glu 270 mg/dL<H> K+ 4.6 mmol/L Cr  0.99 mg/dL BUN 41.0 mg/dL<H> Phos 4.0 mg/dL Alb 3.0 g/dL<L> PAB n/a

## 2020-12-30 LAB
A1C WITH ESTIMATED AVERAGE GLUCOSE RESULT: 7.4 % — HIGH (ref 4–5.6)
ALBUMIN SERPL ELPH-MCNC: 2.5 G/DL — LOW (ref 3.3–5.2)
ALP SERPL-CCNC: 133 U/L — HIGH (ref 40–120)
ALT FLD-CCNC: 44 U/L — HIGH
ANION GAP SERPL CALC-SCNC: 15 MMOL/L — SIGNIFICANT CHANGE UP (ref 5–17)
AST SERPL-CCNC: 47 U/L — HIGH
BILIRUB DIRECT SERPL-MCNC: 0.2 MG/DL — SIGNIFICANT CHANGE UP (ref 0–0.3)
BILIRUB INDIRECT FLD-MCNC: 0.4 MG/DL — SIGNIFICANT CHANGE UP (ref 0.2–1)
BILIRUB SERPL-MCNC: 0.6 MG/DL — SIGNIFICANT CHANGE UP (ref 0.4–2)
BUN SERPL-MCNC: 31 MG/DL — HIGH (ref 8–20)
CALCIUM SERPL-MCNC: 8.6 MG/DL — SIGNIFICANT CHANGE UP (ref 8.6–10.2)
CHLORIDE SERPL-SCNC: 104 MMOL/L — SIGNIFICANT CHANGE UP (ref 98–107)
CO2 SERPL-SCNC: 23 MMOL/L — SIGNIFICANT CHANGE UP (ref 22–29)
CREAT SERPL-MCNC: 0.88 MG/DL — SIGNIFICANT CHANGE UP (ref 0.5–1.3)
ESTIMATED AVERAGE GLUCOSE: 166 MG/DL — HIGH (ref 68–114)
GLUCOSE BLDC GLUCOMTR-MCNC: 159 MG/DL — HIGH (ref 70–99)
GLUCOSE BLDC GLUCOMTR-MCNC: 159 MG/DL — HIGH (ref 70–99)
GLUCOSE BLDC GLUCOMTR-MCNC: 232 MG/DL — HIGH (ref 70–99)
GLUCOSE BLDC GLUCOMTR-MCNC: 236 MG/DL — HIGH (ref 70–99)
GLUCOSE SERPL-MCNC: 148 MG/DL — HIGH (ref 70–99)
HCT VFR BLD CALC: 41.3 % — SIGNIFICANT CHANGE UP (ref 39–50)
HGB BLD-MCNC: 13.5 G/DL — SIGNIFICANT CHANGE UP (ref 13–17)
MAGNESIUM SERPL-MCNC: 2.4 MG/DL — SIGNIFICANT CHANGE UP (ref 1.6–2.6)
MCHC RBC-ENTMCNC: 30.7 PG — SIGNIFICANT CHANGE UP (ref 27–34)
MCHC RBC-ENTMCNC: 32.7 GM/DL — SIGNIFICANT CHANGE UP (ref 32–36)
MCV RBC AUTO: 93.9 FL — SIGNIFICANT CHANGE UP (ref 80–100)
PHOSPHATE SERPL-MCNC: 3.9 MG/DL — SIGNIFICANT CHANGE UP (ref 2.4–4.7)
PLATELET # BLD AUTO: 293 K/UL — SIGNIFICANT CHANGE UP (ref 150–400)
POTASSIUM SERPL-MCNC: 4.8 MMOL/L — SIGNIFICANT CHANGE UP (ref 3.5–5.3)
POTASSIUM SERPL-SCNC: 4.8 MMOL/L — SIGNIFICANT CHANGE UP (ref 3.5–5.3)
PROT SERPL-MCNC: 6.6 G/DL — SIGNIFICANT CHANGE UP (ref 6.6–8.7)
RBC # BLD: 4.4 M/UL — SIGNIFICANT CHANGE UP (ref 4.2–5.8)
RBC # FLD: 13.9 % — SIGNIFICANT CHANGE UP (ref 10.3–14.5)
SODIUM SERPL-SCNC: 142 MMOL/L — SIGNIFICANT CHANGE UP (ref 135–145)
WBC # BLD: 14.56 K/UL — HIGH (ref 3.8–10.5)
WBC # FLD AUTO: 14.56 K/UL — HIGH (ref 3.8–10.5)

## 2020-12-30 PROCEDURE — 99233 SBSQ HOSP IP/OBS HIGH 50: CPT

## 2020-12-30 PROCEDURE — 99232 SBSQ HOSP IP/OBS MODERATE 35: CPT

## 2020-12-30 RX ORDER — ACETAMINOPHEN 500 MG
650 TABLET ORAL ONCE
Refills: 0 | Status: COMPLETED | OUTPATIENT
Start: 2020-12-30 | End: 2020-12-30

## 2020-12-30 RX ADMIN — Medication 2: at 08:42

## 2020-12-30 RX ADMIN — INSULIN GLARGINE 20 UNIT(S): 100 INJECTION, SOLUTION SUBCUTANEOUS at 21:58

## 2020-12-30 RX ADMIN — Medication 6 MILLIGRAM(S): at 05:36

## 2020-12-30 RX ADMIN — REMDESIVIR 500 MILLIGRAM(S): 5 INJECTION INTRAVENOUS at 18:03

## 2020-12-30 RX ADMIN — CHLORHEXIDINE GLUCONATE 1 APPLICATION(S): 213 SOLUTION TOPICAL at 14:39

## 2020-12-30 RX ADMIN — Medication 650 MILLIGRAM(S): at 18:02

## 2020-12-30 RX ADMIN — Medication 4: at 10:33

## 2020-12-30 RX ADMIN — Medication 2: at 18:02

## 2020-12-30 RX ADMIN — ENOXAPARIN SODIUM 100 MILLIGRAM(S): 100 INJECTION SUBCUTANEOUS at 10:33

## 2020-12-30 RX ADMIN — ENOXAPARIN SODIUM 100 MILLIGRAM(S): 100 INJECTION SUBCUTANEOUS at 21:59

## 2020-12-30 NOTE — PROGRESS NOTE ADULT - SUBJECTIVE AND OBJECTIVE BOX
[facility]  INFECTIOUS DISEASES AND INTERNAL MEDICINE at [place]  =======================================================  Jamie Morales MD  Diplomates American Board of Internal Medicine and Infectious Diseases  Tel: [phone]      Fax: [phone]  =======================================================    [place]LLA [id number]    Follow up: COVID 19   remains on HF  feels much better than previously        Allergies:  No Known Allergies      REVIEW OF SYSTEMS:  CONSTITUTIONAL:  No Fever or chills  HEENT:  No diplopia or blurred vision.  No earache, sore throat or runny nose.  CARDIOVASCULAR:  No pressure, squeezing, strangling, tightness, heaviness or aching about the chest, neck, axilla or epigastrium.  RESPIRATORY:  No cough, + shortness of breath  GASTROINTESTINAL:  No nausea, vomiting or diarrhea.  GENITOURINARY:  No dysuria, frequency or urgency.   MUSCULOSKELETAL:  no joint aches, no muscle pain  SKIN:  No change in skin, hair or nails.  NEUROLOGIC:  No Headaches, seizures   PSYCHIATRIC:  No disorder of thought or mood.  ENDOCRINE:  No heat or cold intolerance  HEMATOLOGICAL:  No easy bruising or bleeding.       Physical Exam:  GEN: mild respiratory failure   HEENT: normocephalic and atraumatic. EOMI. PERRL.  Anicteric  NECK: Supple.   LUNGS: Coarse BS B/L  HEART: Regular rate and rhythm  ABDOMEN: Soft, nontender, and nondistended.  Positive bowel sounds.    : No CVA tenderness  EXTREMITIES: Without any edema.  MSK: No joint swelling  NEUROLOGIC:  No Focal Deficits  PSYCHIATRIC: Appropriate affect .  SKIN: No Rash        Vitals:  ============  T(F): 99 (30 Dec 2020 07:32), Max: 99 (30 Dec 2020 07:32)  HR: 81 (30 Dec 2020 07:32)  BP: 127/83 (30 Dec 2020 07:32)  RR: 20 (30 Dec 2020 07:32)  SpO2: 95% (30 Dec 2020 07:32) (93% - 95%)  temp max in last 48H T(F): , Max: 99 (12-30-20 @ 07:32)    =======================================================  Current Antibiotics:  remdesivir  IVPB   IV Intermittent   remdesivir  IVPB 100 milliGRAM(s) IV Intermittent every 24 hours    Other medications:  chlorhexidine 2% Cloths 1 Application(s) Topical <User Schedule>  dexAMETHasone  Injectable 6 milliGRAM(s) IV Push daily  enoxaparin Injectable 100 milliGRAM(s) SubCutaneous every 12 hours  insulin glargine Injectable (LANTUS) 20 Unit(s) SubCutaneous at bedtime  insulin lispro (ADMELOG) corrective regimen sliding scale   SubCutaneous three times a day before meals  multiple electrolytes Injection Type 1 1000 milliLiter(s) IV Continuous <Continuous>      =======================================================  Labs:                        13.5   14.56 )-----------( 293      ( 30 Dec 2020 08:31 )             41.3      12-30    142  |  104  |  31.0<H>  ----------------------------<  148<H>  4.8   |  23.0  |  0.88    Ca    8.6      30 Dec 2020 08:31  Phos  3.9     12-30  Mg     2.4     12-30    TPro  6.6  /  Alb  2.5<L>  /  TBili  0.6  /  DBili  0.2  /  AST  47<H>  /  ALT  44<H>  /  AlkPhos  133<H>  12-30      Creatinine, Serum: 0.88 mg/dL (12-30-20 @ 08:31)  Creatinine, Serum: 0.99 mg/dL (12-29-20 @ 05:12)  Creatinine, Serum: 1.00 mg/dL (12-29-20 @ 05:12)  Creatinine, Serum: 0.96 mg/dL (12-28-20 @ 08:59)  Creatinine, Serum: 0.93 mg/dL (12-28-20 @ 07:19)  Creatinine, Serum: 1.23 mg/dL (12-27-20 @ 15:01)    Procalcitonin, Serum: 2.34 ng/mL (12-28-20 @ 07:18)  Procalcitonin, Serum: 1.86 ng/mL (12-27-20 @ 15:01)    D-Dimer Assay, Quantitative: >24244 ng/mL DDU (12-27-20 @ 15:01)    Ferritin, Serum: 962 ng/mL (12-28-20 @ 07:18)  Ferritin, Serum: 839 ng/mL (12-27-20 @ 15:01)    C-Reactive Protein, Serum: 34.44 mg/dL (12-28-20 @ 07:18)  C-Reactive Protein, Serum: 30.97 mg/dL (12-27-20 @ 15:01)    WBC Count: 14.56 K/uL (12-30-20 @ 08:31)  WBC Count: 15.99 K/uL (12-29-20 @ 05:12)  WBC Count: 14.73 K/uL (12-28-20 @ 08:59)  WBC Count: 14.59 K/uL (12-27-20 @ 15:01)      COVID-19 IgG Antibody Interpretation: Positive (12-28-20 @ 12:13)  COVID-19 IgG Antibody Index: 7.13 Index (12-28-20 @ 12:13)  COVID-19 PCR: Detected (12-27-20 @ 15:50)      Alkaline Phosphatase, Serum: 133 U/L (12-30-20 @ 08:31)  Alkaline Phosphatase, Serum: 115 U/L (12-29-20 @ 05:12)  Alkaline Phosphatase, Serum: 117 U/L (12-29-20 @ 05:12)  Alkaline Phosphatase, Serum: 126 U/L (12-28-20 @ 07:19)  Alkaline Phosphatase, Serum: 130 U/L (12-27-20 @ 15:01)  Alanine Aminotransferase (ALT/SGPT): 44 U/L (12-30-20 @ 08:31)  Alanine Aminotransferase (ALT/SGPT): 43 U/L (12-29-20 @ 05:12)  Alanine Aminotransferase (ALT/SGPT): 43 U/L (12-29-20 @ 05:12)  Alanine Aminotransferase (ALT/SGPT): 57 U/L (12-28-20 @ 07:19)  Alanine Aminotransferase (ALT/SGPT): 67 U/L (12-27-20 @ 15:01)  Aspartate Aminotransferase (AST/SGOT): 47 U/L (12-30-20 @ 08:31)  Aspartate Aminotransferase (AST/SGOT): 19 U/L (12-29-20 @ 05:12)  Aspartate Aminotransferase (AST/SGOT): 20 U/L (12-29-20 @ 05:12)  Aspartate Aminotransferase (AST/SGOT): 32 U/L (12-28-20 @ 07:19)  Aspartate Aminotransferase (AST/SGOT): 63 U/L (12-27-20 @ 15:01)  Bilirubin Total, Serum: 0.6 mg/dL (12-30-20 @ 08:31)  Bilirubin Total, Serum: 0.4 mg/dL (12-29-20 @ 05:12)  Bilirubin Total, Serum: 0.5 mg/dL (12-29-20 @ 05:12)  Bilirubin Total, Serum: 0.7 mg/dL (12-28-20 @ 07:19)  Bilirubin Total, Serum: 1.2 mg/dL (12-27-20 @ 15:01)  Bilirubin Direct, Serum: 0.2 mg/dL (12-30-20 @ 08:31)  Bilirubin Direct, Serum: 0.2 mg/dL (12-29-20 @ 05:12)  Bilirubin Direct, Serum: 0.2 mg/dL (12-28-20 @ 07:19)        < from: CT Angio Chest w/ IV Cont (12.28.20 @ 21:28) >  EXAM:  CT ANGIO CHEST (W)AW IC                          PROCEDURE DATE:  12/28/2020          INTERPRETATION:  CLINICAL INFORMATION: +COVID-19, shortness of breath, high d-dimer, assess PE.    PROCEDURE: CT angiography of the chest was performed with intravenous contrast utilizing dedicated PE protocol. 64 mls of Omnipaque-350 administered without complication. Coronal and sagittal reconstruction images were obtained. Axial MIP images were obtained from a separate workstation.    COMPARISON: None.    FINDINGS: The patient's respiratory motion degrades images.    LUNGS AND AIRWAYS: Patent central airways. Predominantly peripheral groundglass/patchy opacities in both lungs.  PLEURA: No pleural effusion or pneumothorax.  HEART: Borderline enlarged heart. No pericardial effusion.  VESSELS: Emboli in the segmental/subsegmental pulmonary arteries of bilateral lower lobes and right upper lobe and segmental pulmonary artery of the right middle lobe. Question artifact versus emboli in the segmental pulmonary artery of the left upper lobe (5:60). Mild main pulmonary artery enlargement (3.2 cm), which can be seen in pulmonary arterial hypertension.  Normal caliber of the thoracic aorta. Bovine arch. Question 0.7 x 0.7 cm aneurysmal dilatation along the posterior aspect of the proximal brachiocephalic artery/proximal left common carotid artery.  MEDIASTINUM AND JONATHON: Subcentimeter mediastinal lymph nodes without lymphadenopathy.  CHEST WALL AND LOWER NECK: Mild bilateral gynecomastia.  UPPER ABDOMEN: Hepatic cysts measuring up to 1.4 x 2.0 cm. Subcentimeter hypodense foci in the liver, too small to characterize. Layering of density in the gallbladder, which may represent sludge/stones. Nonspecific bilateral perinephric stranding.  BONES: Degenerative changes of the spine.    IMPRESSION:    Bilateral peripheral pulmonary emboli as described.    Commonly reported imaging features of COVID-19 pneumonia.    Additional findings as described.    < end of copied text >

## 2020-12-30 NOTE — PROGRESS NOTE ADULT - SUBJECTIVE AND OBJECTIVE BOX
Chief complaint: COVID    Patient seen and examined at bedside. Patient states he is doing well, reports improvement in shortness of breath. Denies fever, chills, chest pain, nausea, vomiting, abdominal pain, diarrhea or constipation.     Vital Signs Last 24 Hrs  T(F): 99 (30 Dec 2020 07:32), Max: 99 (30 Dec 2020 07:32)  HR: 81 (30 Dec 2020 07:32) (81 - 99)  BP: 127/83 (30 Dec 2020 07:32) (126/76 - 154/96)  RR: 20 (30 Dec 2020 07:32) (18 - 28)  SpO2: 95% (30 Dec 2020 07:32) (93% - 95%)    Physical Exam:  Constitutional: alert and oriented, in no acute distress, comfortable on HFNC  Neck: Soft and supple, No LAD, No JVD  Respiratory: Coarse breath sounds to auscultation bilaterally  Cardiovascular: Regular rate and rhythm, no murmurs, gallops, rubs  Gastrointestinal: Soft, non-tender to palpation, +bs  Vascular: 2+ peripheral pulses  Neurological: A/O x 3, no focal neurological deficits  Musculoskeletal:  no lower extremity edema bilaterally    Labs:                        13.5   14.56 )-----------( 293      ( 30 Dec 2020 08:31 )             41.3   12-30    142  |  104  |  31.0<H>  ----------------------------<  148<H>  4.8   |  23.0  |  0.88    Ca    8.6      30 Dec 2020 08:31  Phos  3.9     12-30  Mg     2.4     12-30    TPro  6.6  /  Alb  2.5<L>  /  TBili  0.6  /  DBili  0.2  /  AST  47<H>  /  ALT  44<H>  /  AlkPhos  133<H>  12-30

## 2020-12-31 LAB
ALBUMIN SERPL ELPH-MCNC: 2.7 G/DL — LOW (ref 3.3–5.2)
ALP SERPL-CCNC: 103 U/L — SIGNIFICANT CHANGE UP (ref 40–120)
ALT FLD-CCNC: 54 U/L — HIGH
ANION GAP SERPL CALC-SCNC: 9 MMOL/L — SIGNIFICANT CHANGE UP (ref 5–17)
AST SERPL-CCNC: 40 U/L — HIGH
BILIRUB DIRECT SERPL-MCNC: 0.2 MG/DL — SIGNIFICANT CHANGE UP (ref 0–0.3)
BILIRUB INDIRECT FLD-MCNC: 0.5 MG/DL — SIGNIFICANT CHANGE UP (ref 0.2–1)
BILIRUB SERPL-MCNC: 0.6 MG/DL — SIGNIFICANT CHANGE UP (ref 0.4–2)
BUN SERPL-MCNC: 27 MG/DL — HIGH (ref 8–20)
CALCIUM SERPL-MCNC: 8.7 MG/DL — SIGNIFICANT CHANGE UP (ref 8.6–10.2)
CHLORIDE SERPL-SCNC: 103 MMOL/L — SIGNIFICANT CHANGE UP (ref 98–107)
CO2 SERPL-SCNC: 25 MMOL/L — SIGNIFICANT CHANGE UP (ref 22–29)
CREAT SERPL-MCNC: 0.91 MG/DL — SIGNIFICANT CHANGE UP (ref 0.5–1.3)
GLUCOSE BLDC GLUCOMTR-MCNC: 158 MG/DL — HIGH (ref 70–99)
GLUCOSE BLDC GLUCOMTR-MCNC: 171 MG/DL — HIGH (ref 70–99)
GLUCOSE BLDC GLUCOMTR-MCNC: 218 MG/DL — HIGH (ref 70–99)
GLUCOSE BLDC GLUCOMTR-MCNC: 268 MG/DL — HIGH (ref 70–99)
GLUCOSE SERPL-MCNC: 198 MG/DL — HIGH (ref 70–99)
HCT VFR BLD CALC: 39.6 % — SIGNIFICANT CHANGE UP (ref 39–50)
HGB BLD-MCNC: 13 G/DL — SIGNIFICANT CHANGE UP (ref 13–17)
MCHC RBC-ENTMCNC: 30.4 PG — SIGNIFICANT CHANGE UP (ref 27–34)
MCHC RBC-ENTMCNC: 32.8 GM/DL — SIGNIFICANT CHANGE UP (ref 32–36)
MCV RBC AUTO: 92.7 FL — SIGNIFICANT CHANGE UP (ref 80–100)
PLATELET # BLD AUTO: 346 K/UL — SIGNIFICANT CHANGE UP (ref 150–400)
POTASSIUM SERPL-MCNC: 5.1 MMOL/L — SIGNIFICANT CHANGE UP (ref 3.5–5.3)
POTASSIUM SERPL-SCNC: 5.1 MMOL/L — SIGNIFICANT CHANGE UP (ref 3.5–5.3)
PROT SERPL-MCNC: 6.4 G/DL — LOW (ref 6.6–8.7)
RBC # BLD: 4.27 M/UL — SIGNIFICANT CHANGE UP (ref 4.2–5.8)
RBC # FLD: 13.9 % — SIGNIFICANT CHANGE UP (ref 10.3–14.5)
SODIUM SERPL-SCNC: 137 MMOL/L — SIGNIFICANT CHANGE UP (ref 135–145)
WBC # BLD: 15.28 K/UL — HIGH (ref 3.8–10.5)
WBC # FLD AUTO: 15.28 K/UL — HIGH (ref 3.8–10.5)

## 2020-12-31 PROCEDURE — 99233 SBSQ HOSP IP/OBS HIGH 50: CPT

## 2020-12-31 RX ADMIN — REMDESIVIR 500 MILLIGRAM(S): 5 INJECTION INTRAVENOUS at 17:35

## 2020-12-31 RX ADMIN — Medication 4: at 08:40

## 2020-12-31 RX ADMIN — Medication 6 MILLIGRAM(S): at 04:48

## 2020-12-31 RX ADMIN — CHLORHEXIDINE GLUCONATE 1 APPLICATION(S): 213 SOLUTION TOPICAL at 04:48

## 2020-12-31 RX ADMIN — ENOXAPARIN SODIUM 100 MILLIGRAM(S): 100 INJECTION SUBCUTANEOUS at 11:37

## 2020-12-31 RX ADMIN — INSULIN GLARGINE 20 UNIT(S): 100 INJECTION, SOLUTION SUBCUTANEOUS at 21:28

## 2020-12-31 RX ADMIN — Medication 30 MILLILITER(S): at 14:04

## 2020-12-31 RX ADMIN — Medication 2: at 17:35

## 2020-12-31 RX ADMIN — Medication 6: at 12:29

## 2020-12-31 RX ADMIN — ENOXAPARIN SODIUM 100 MILLIGRAM(S): 100 INJECTION SUBCUTANEOUS at 21:28

## 2020-12-31 NOTE — PROGRESS NOTE ADULT - SUBJECTIVE AND OBJECTIVE BOX
Chief complaint: COVID    Patient seen and examined at bedside. No acute overnight events reported. Patient states he is doing well just feels more tired than usual. Denies fever, chills, cough, chest pain, shortness of breath, nausea or vomiting.    Vital Signs Last 24 Hrs  T(F): 98.7 (31 Dec 2020 09:00), Max: 98.9 (31 Dec 2020 05:27)  HR: 83 (31 Dec 2020 09:00) (78 - 88)  BP: 147/95 (31 Dec 2020 09:00) (139/87 - 149/98)  RR: 20 (31 Dec 2020 09:00) (18 - 20)  SpO2: 93% (31 Dec 2020 09:00) (93% - 97%)    Physical Exam:  Constitutional: alert and oriented, in no acute distress, comfortable on HFNC   Neck: Soft and supple  Respiratory: Good air entry bilaterally  Cardiovascular: Regular rate and rhythm, no murmurs, gallops, rubs  Gastrointestinal: Soft, non-tender to palpation, +bs  Vascular: 2+ peripheral pulses  Neurological: A/O x 3, no focal neurological deficits  Musculoskeletal: 5/5 strength b/l upper and lower extremities, no lower extremity edema bilaterally    Labs:                        13.0   15.28 )-----------( 346      ( 31 Dec 2020 08:48 )             39.6   12-31    137  |  103  |  27.0<H>  ----------------------------<  198<H>  5.1   |  25.0  |  0.91    Ca    8.7      31 Dec 2020 08:48  Phos  3.9     12-30  Mg     2.4     12-30    TPro  6.4<L>  /  Alb  2.7<L>  /  TBili  0.6  /  DBili  0.2  /  AST  40<H>  /  ALT  54<H>  /  AlkPhos  103  12-31

## 2021-01-01 LAB
ALBUMIN SERPL ELPH-MCNC: 2.8 G/DL — LOW (ref 3.3–5.2)
ALBUMIN SERPL ELPH-MCNC: 2.8 G/DL — LOW (ref 3.3–5.2)
ALP SERPL-CCNC: 101 U/L — SIGNIFICANT CHANGE UP (ref 40–120)
ALP SERPL-CCNC: 105 U/L — SIGNIFICANT CHANGE UP (ref 40–120)
ALT FLD-CCNC: 64 U/L — HIGH
ALT FLD-CCNC: 66 U/L — HIGH
ANION GAP SERPL CALC-SCNC: 13 MMOL/L — SIGNIFICANT CHANGE UP (ref 5–17)
AST SERPL-CCNC: 46 U/L — HIGH
AST SERPL-CCNC: 47 U/L — HIGH
BILIRUB DIRECT SERPL-MCNC: 0.2 MG/DL — SIGNIFICANT CHANGE UP (ref 0–0.3)
BILIRUB INDIRECT FLD-MCNC: 0.5 MG/DL — SIGNIFICANT CHANGE UP (ref 0.2–1)
BILIRUB SERPL-MCNC: 0.6 MG/DL — SIGNIFICANT CHANGE UP (ref 0.4–2)
BILIRUB SERPL-MCNC: 0.7 MG/DL — SIGNIFICANT CHANGE UP (ref 0.4–2)
BUN SERPL-MCNC: 24 MG/DL — HIGH (ref 8–20)
CALCIUM SERPL-MCNC: 8.5 MG/DL — LOW (ref 8.6–10.2)
CHLORIDE SERPL-SCNC: 98 MMOL/L — SIGNIFICANT CHANGE UP (ref 98–107)
CO2 SERPL-SCNC: 23 MMOL/L — SIGNIFICANT CHANGE UP (ref 22–29)
CREAT SERPL-MCNC: 0.85 MG/DL — SIGNIFICANT CHANGE UP (ref 0.5–1.3)
CRP SERPL-MCNC: 3.01 MG/DL — HIGH (ref 0–0.4)
D DIMER BLD IA.RAPID-MCNC: 6585 NG/ML DDU — HIGH
FERRITIN SERPL-MCNC: 991 NG/ML — HIGH (ref 30–400)
GLUCOSE BLDC GLUCOMTR-MCNC: 148 MG/DL — HIGH (ref 70–99)
GLUCOSE BLDC GLUCOMTR-MCNC: 162 MG/DL — HIGH (ref 70–99)
GLUCOSE BLDC GLUCOMTR-MCNC: 185 MG/DL — HIGH (ref 70–99)
GLUCOSE BLDC GLUCOMTR-MCNC: 275 MG/DL — HIGH (ref 70–99)
GLUCOSE SERPL-MCNC: 230 MG/DL — HIGH (ref 70–99)
HCT VFR BLD CALC: 41.5 % — SIGNIFICANT CHANGE UP (ref 39–50)
HGB BLD-MCNC: 13.7 G/DL — SIGNIFICANT CHANGE UP (ref 13–17)
LDH SERPL L TO P-CCNC: 464 U/L — HIGH (ref 98–192)
MCHC RBC-ENTMCNC: 30.6 PG — SIGNIFICANT CHANGE UP (ref 27–34)
MCHC RBC-ENTMCNC: 33 GM/DL — SIGNIFICANT CHANGE UP (ref 32–36)
MCV RBC AUTO: 92.8 FL — SIGNIFICANT CHANGE UP (ref 80–100)
PLATELET # BLD AUTO: 394 K/UL — SIGNIFICANT CHANGE UP (ref 150–400)
POTASSIUM SERPL-MCNC: 5.3 MMOL/L — SIGNIFICANT CHANGE UP (ref 3.5–5.3)
POTASSIUM SERPL-SCNC: 5.3 MMOL/L — SIGNIFICANT CHANGE UP (ref 3.5–5.3)
PROCALCITONIN SERPL-MCNC: 0.26 NG/ML — HIGH (ref 0.02–0.1)
PROT SERPL-MCNC: 6.5 G/DL — LOW (ref 6.6–8.7)
PROT SERPL-MCNC: 6.6 G/DL — SIGNIFICANT CHANGE UP (ref 6.6–8.7)
RBC # BLD: 4.47 M/UL — SIGNIFICANT CHANGE UP (ref 4.2–5.8)
RBC # FLD: 13.5 % — SIGNIFICANT CHANGE UP (ref 10.3–14.5)
SODIUM SERPL-SCNC: 134 MMOL/L — LOW (ref 135–145)
WBC # BLD: 14.76 K/UL — HIGH (ref 3.8–10.5)
WBC # FLD AUTO: 14.76 K/UL — HIGH (ref 3.8–10.5)

## 2021-01-01 PROCEDURE — 99233 SBSQ HOSP IP/OBS HIGH 50: CPT

## 2021-01-01 RX ADMIN — Medication 2: at 09:00

## 2021-01-01 RX ADMIN — ENOXAPARIN SODIUM 100 MILLIGRAM(S): 100 INJECTION SUBCUTANEOUS at 22:27

## 2021-01-01 RX ADMIN — INSULIN GLARGINE 20 UNIT(S): 100 INJECTION, SOLUTION SUBCUTANEOUS at 22:27

## 2021-01-01 RX ADMIN — Medication 6 MILLIGRAM(S): at 05:49

## 2021-01-01 RX ADMIN — Medication 6: at 12:23

## 2021-01-01 RX ADMIN — ENOXAPARIN SODIUM 100 MILLIGRAM(S): 100 INJECTION SUBCUTANEOUS at 09:15

## 2021-01-01 NOTE — PROGRESS NOTE ADULT - SUBJECTIVE AND OBJECTIVE BOX
Chief complaint: COVID    Patient seen and examined at bedside. No acute overnight events reported. Patient states he is doing well, denies cough, fever, chills, chest pain, nausea, vomiting or abdominal pain. Patient would like to get out of the bed to chair.     Vital Signs Last 24 Hrs  T(F): 98.7 (01 Jan 2021 08:16), Max: 99.5 (31 Dec 2020 16:58)  HR: 88 (01 Jan 2021 08:58) (80 - 88)  BP: 137/91 (01 Jan 2021 08:16) (119/66 - 137/91)  RR: 18 (01 Jan 2021 08:16) (18 - 20)  SpO2: 96% (01 Jan 2021 08:58) (96% - 99%)    Physical Exam:  Constitutional: alert and oriented, in no acute distress, obese male, breathing comfortably on HFNC  Neck: Soft and supple, No LAD, No JVD  Respiratory: Good air entry bilaterally  Cardiovascular: Regular rate and rhythm no murmurs, gallops, rubs  Gastrointestinal: Soft, non-tender to palpation, +bs  Vascular: 2+ peripheral pulses  Neurological: A/O x 3, no focal neurological deficits  Musculoskeletal: trace bilateral lower extremity edema    Labs:                        13.7   14.76 )-----------( 394      ( 01 Jan 2021 10:35 )             41.5   12-31    137  |  103  |  27.0<H>  ----------------------------<  198<H>  5.1   |  25.0  |  0.91    Ca    8.7      31 Dec 2020 08:48    TPro  6.4<L>  /  Alb  2.7<L>  /  TBili  0.6  /  DBili  0.2  /  AST  40<H>  /  ALT  54<H>  /  AlkPhos  103  12-31

## 2021-01-02 LAB
ALBUMIN SERPL ELPH-MCNC: 2.9 G/DL — LOW (ref 3.3–5.2)
ALBUMIN SERPL ELPH-MCNC: 3 G/DL — LOW (ref 3.3–5.2)
ALP SERPL-CCNC: 106 U/L — SIGNIFICANT CHANGE UP (ref 40–120)
ALP SERPL-CCNC: 106 U/L — SIGNIFICANT CHANGE UP (ref 40–120)
ALT FLD-CCNC: 77 U/L — HIGH
ALT FLD-CCNC: 78 U/L — HIGH
ANION GAP SERPL CALC-SCNC: 9 MMOL/L — SIGNIFICANT CHANGE UP (ref 5–17)
AST SERPL-CCNC: 44 U/L — HIGH
AST SERPL-CCNC: 47 U/L — HIGH
BILIRUB DIRECT SERPL-MCNC: 0.2 MG/DL — SIGNIFICANT CHANGE UP (ref 0–0.3)
BILIRUB INDIRECT FLD-MCNC: 0.5 MG/DL — SIGNIFICANT CHANGE UP (ref 0.2–1)
BILIRUB SERPL-MCNC: 0.7 MG/DL — SIGNIFICANT CHANGE UP (ref 0.4–2)
BILIRUB SERPL-MCNC: 0.7 MG/DL — SIGNIFICANT CHANGE UP (ref 0.4–2)
BUN SERPL-MCNC: 25 MG/DL — HIGH (ref 8–20)
CALCIUM SERPL-MCNC: 8.8 MG/DL — SIGNIFICANT CHANGE UP (ref 8.6–10.2)
CHLORIDE SERPL-SCNC: 98 MMOL/L — SIGNIFICANT CHANGE UP (ref 98–107)
CO2 SERPL-SCNC: 24 MMOL/L — SIGNIFICANT CHANGE UP (ref 22–29)
CREAT SERPL-MCNC: 0.91 MG/DL — SIGNIFICANT CHANGE UP (ref 0.5–1.3)
GLUCOSE BLDC GLUCOMTR-MCNC: 152 MG/DL — HIGH (ref 70–99)
GLUCOSE BLDC GLUCOMTR-MCNC: 162 MG/DL — HIGH (ref 70–99)
GLUCOSE BLDC GLUCOMTR-MCNC: 178 MG/DL — HIGH (ref 70–99)
GLUCOSE BLDC GLUCOMTR-MCNC: 210 MG/DL — HIGH (ref 70–99)
GLUCOSE SERPL-MCNC: 203 MG/DL — HIGH (ref 70–99)
HCT VFR BLD CALC: 42 % — SIGNIFICANT CHANGE UP (ref 39–50)
HGB BLD-MCNC: 14.1 G/DL — SIGNIFICANT CHANGE UP (ref 13–17)
MCHC RBC-ENTMCNC: 30.8 PG — SIGNIFICANT CHANGE UP (ref 27–34)
MCHC RBC-ENTMCNC: 33.6 GM/DL — SIGNIFICANT CHANGE UP (ref 32–36)
MCV RBC AUTO: 91.7 FL — SIGNIFICANT CHANGE UP (ref 80–100)
PLATELET # BLD AUTO: 443 K/UL — HIGH (ref 150–400)
POTASSIUM SERPL-MCNC: 5 MMOL/L — SIGNIFICANT CHANGE UP (ref 3.5–5.3)
POTASSIUM SERPL-SCNC: 5 MMOL/L — SIGNIFICANT CHANGE UP (ref 3.5–5.3)
PROT SERPL-MCNC: 6.7 G/DL — SIGNIFICANT CHANGE UP (ref 6.6–8.7)
PROT SERPL-MCNC: 6.8 G/DL — SIGNIFICANT CHANGE UP (ref 6.6–8.7)
RBC # BLD: 4.58 M/UL — SIGNIFICANT CHANGE UP (ref 4.2–5.8)
RBC # FLD: 13.6 % — SIGNIFICANT CHANGE UP (ref 10.3–14.5)
SODIUM SERPL-SCNC: 131 MMOL/L — LOW (ref 135–145)
WBC # BLD: 12.56 K/UL — HIGH (ref 3.8–10.5)
WBC # FLD AUTO: 12.56 K/UL — HIGH (ref 3.8–10.5)

## 2021-01-02 PROCEDURE — 99233 SBSQ HOSP IP/OBS HIGH 50: CPT

## 2021-01-02 RX ADMIN — Medication 4: at 12:34

## 2021-01-02 RX ADMIN — INSULIN GLARGINE 20 UNIT(S): 100 INJECTION, SOLUTION SUBCUTANEOUS at 20:49

## 2021-01-02 RX ADMIN — ENOXAPARIN SODIUM 100 MILLIGRAM(S): 100 INJECTION SUBCUTANEOUS at 20:49

## 2021-01-02 RX ADMIN — Medication 2: at 08:30

## 2021-01-02 RX ADMIN — Medication 2: at 17:18

## 2021-01-02 RX ADMIN — ENOXAPARIN SODIUM 100 MILLIGRAM(S): 100 INJECTION SUBCUTANEOUS at 10:34

## 2021-01-02 RX ADMIN — Medication 6 MILLIGRAM(S): at 05:05

## 2021-01-02 NOTE — PROGRESS NOTE ADULT - SUBJECTIVE AND OBJECTIVE BOX
Patient is a 50y old  Male who presents with a chief complaint of respiratory failure (01 Jan 2021 10:59)    Patient seen and examined at bedside.     ALLERGIES:  No Known Allergies    MEDICATIONS  (STANDING):  dexAMETHasone  Injectable 6 milliGRAM(s) IV Push daily  enoxaparin Injectable 100 milliGRAM(s) SubCutaneous every 12 hours  insulin glargine Injectable (LANTUS) 20 Unit(s) SubCutaneous at bedtime  insulin lispro (ADMELOG) corrective regimen sliding scale   SubCutaneous three times a day before meals  multiple electrolytes Injection Type 1 1000 milliLiter(s) (75 mL/Hr) IV Continuous <Continuous>    MEDICATIONS  (PRN):  morphine  - Injectable 1 milliGRAM(s) IV Push every 4 hours PRN Severe Pain (7 - 10)    Vital Signs Last 24 Hrs  T(F): 97.9 (02 Jan 2021 08:00), Max: 99.4 (02 Jan 2021 04:45)  HR: 94 (02 Jan 2021 08:00) (79 - 97)  BP: 123/87 (02 Jan 2021 08:00) (122/81 - 126/76)  RR: 18 (02 Jan 2021 08:00) (18 - 20)  SpO2: 94% (02 Jan 2021 08:00) (94% - 99%)  I&O's Summary    01 Jan 2021 07:01  -  02 Jan 2021 07:00  --------------------------------------------------------  IN: 400 mL / OUT: 1000 mL / NET: -600 mL      PHYSICAL EXAM:  General: NAD, A/O x 3, obese  ENT: MMM, no thrush  Neck: Supple, No JVD  Lungs: decreased breath sounds b/l  Cardio: +s1/s2  Abdomen: Soft, Nontender, Nondistended; Bowel sounds present  Extremities: able to move all extremities    LABS:                        14.1   12.56 )-----------( 443      ( 02 Jan 2021 09:40 )             42.0     01-01    134  |  98  |  24.0  ----------------------------<  230  5.3   |  23.0  |  0.85    Ca    8.5      01 Jan 2021 10:35    TPro  6.6  /  Alb  2.8  /  TBili  0.7  /  DBili  0.2  /  AST  46  /  ALT  66  /  AlkPhos  101  01-01    eGFR if Non African American: 101 mL/min/1.73M2 (01-01-21 @ 10:35)  eGFR if African American: 117 mL/min/1.73M2 (01-01-21 @ 10:35)    Procalcitonin, Serum: 0.26 ng/mL (01-01-21 @ 10:35)    Glucose  POCT Blood Glucose.: 178 mg/dL (02 Jan 2021 08:02)  POCT Blood Glucose.: 162 mg/dL (01 Jan 2021 22:17)  POCT Blood Glucose.: 148 mg/dL (01 Jan 2021 16:59)  POCT Blood Glucose.: 275 mg/dL (01 Jan 2021 11:53)    RADIOLOGY & ADDITIONAL TESTS:  < from: CT Angio Chest w/ IV Cont (12.28.20 @ 21:28) >  IMPRESSION:  Bilateral peripheral pulmonary emboli as described.  Commonly reported imaging features of COVID-19 pneumonia.  < end of copied text >    < from: US Duplex Venous Lower Ext Complete, Bilateral (12.27.20 @ 18:31) >  IMPRESSION:  Partial compressibility of the bilateral lower extremity veins in the thigh and calf with very slow, viscous flow bilaterally. Partially occlusive thrombus is not excluded. Repeatexam as clinically indicated.  < end of copied text >    < from: Xray Chest 1 View- PORTABLE-Urgent (12.27.20 @ 16:04) >  IMPRESSION: Multilobar pneumonia.  < end of copied text >

## 2021-01-03 LAB
ALBUMIN SERPL ELPH-MCNC: 2.7 G/DL — LOW (ref 3.3–5.2)
ALP SERPL-CCNC: 101 U/L — SIGNIFICANT CHANGE UP (ref 40–120)
ALT FLD-CCNC: 78 U/L — HIGH
APTT BLD: 36.9 SEC — HIGH (ref 27.5–35.5)
AST SERPL-CCNC: 45 U/L — HIGH
BILIRUB DIRECT SERPL-MCNC: 0.2 MG/DL — SIGNIFICANT CHANGE UP (ref 0–0.3)
BILIRUB INDIRECT FLD-MCNC: 0.5 MG/DL — SIGNIFICANT CHANGE UP (ref 0.2–1)
BILIRUB SERPL-MCNC: 0.6 MG/DL — SIGNIFICANT CHANGE UP (ref 0.4–2)
CREAT SERPL-MCNC: 0.93 MG/DL — SIGNIFICANT CHANGE UP (ref 0.5–1.3)
GLUCOSE BLDC GLUCOMTR-MCNC: 187 MG/DL — HIGH (ref 70–99)
GLUCOSE BLDC GLUCOMTR-MCNC: 200 MG/DL — HIGH (ref 70–99)
GLUCOSE BLDC GLUCOMTR-MCNC: 223 MG/DL — HIGH (ref 70–99)
GLUCOSE BLDC GLUCOMTR-MCNC: 246 MG/DL — HIGH (ref 70–99)
INR BLD: 1.3 RATIO — HIGH (ref 0.88–1.16)
PROT SERPL-MCNC: 6.3 G/DL — LOW (ref 6.6–8.7)
PROTHROM AB SERPL-ACNC: 14.9 SEC — HIGH (ref 10.6–13.6)

## 2021-01-03 PROCEDURE — 99233 SBSQ HOSP IP/OBS HIGH 50: CPT

## 2021-01-03 RX ORDER — WARFARIN SODIUM 2.5 MG/1
5 TABLET ORAL ONCE
Refills: 0 | Status: COMPLETED | OUTPATIENT
Start: 2021-01-03 | End: 2021-01-03

## 2021-01-03 RX ADMIN — Medication 6 MILLIGRAM(S): at 05:24

## 2021-01-03 RX ADMIN — ENOXAPARIN SODIUM 100 MILLIGRAM(S): 100 INJECTION SUBCUTANEOUS at 23:13

## 2021-01-03 RX ADMIN — WARFARIN SODIUM 5 MILLIGRAM(S): 2.5 TABLET ORAL at 23:13

## 2021-01-03 RX ADMIN — Medication 4: at 10:38

## 2021-01-03 RX ADMIN — Medication 2: at 16:00

## 2021-01-03 RX ADMIN — ENOXAPARIN SODIUM 100 MILLIGRAM(S): 100 INJECTION SUBCUTANEOUS at 09:26

## 2021-01-03 RX ADMIN — Medication 4: at 09:25

## 2021-01-03 RX ADMIN — INSULIN GLARGINE 20 UNIT(S): 100 INJECTION, SOLUTION SUBCUTANEOUS at 23:12

## 2021-01-04 LAB
APTT BLD: 41.1 SEC — HIGH (ref 27.5–35.5)
GLUCOSE BLDC GLUCOMTR-MCNC: 150 MG/DL — HIGH (ref 70–99)
GLUCOSE BLDC GLUCOMTR-MCNC: 178 MG/DL — HIGH (ref 70–99)
GLUCOSE BLDC GLUCOMTR-MCNC: 188 MG/DL — HIGH (ref 70–99)
GLUCOSE BLDC GLUCOMTR-MCNC: 190 MG/DL — HIGH (ref 70–99)
HCT VFR BLD CALC: 39.8 % — SIGNIFICANT CHANGE UP (ref 39–50)
HGB BLD-MCNC: 13.4 G/DL — SIGNIFICANT CHANGE UP (ref 13–17)
INR BLD: 1.3 RATIO — HIGH (ref 0.88–1.16)
MCHC RBC-ENTMCNC: 30.7 PG — SIGNIFICANT CHANGE UP (ref 27–34)
MCHC RBC-ENTMCNC: 33.7 GM/DL — SIGNIFICANT CHANGE UP (ref 32–36)
MCV RBC AUTO: 91.3 FL — SIGNIFICANT CHANGE UP (ref 80–100)
PLATELET # BLD AUTO: 418 K/UL — HIGH (ref 150–400)
PROTHROM AB SERPL-ACNC: 14.9 SEC — HIGH (ref 10.6–13.6)
RBC # BLD: 4.36 M/UL — SIGNIFICANT CHANGE UP (ref 4.2–5.8)
RBC # FLD: 13.5 % — SIGNIFICANT CHANGE UP (ref 10.3–14.5)
WBC # BLD: 10.12 K/UL — SIGNIFICANT CHANGE UP (ref 3.8–10.5)
WBC # FLD AUTO: 10.12 K/UL — SIGNIFICANT CHANGE UP (ref 3.8–10.5)

## 2021-01-04 PROCEDURE — 99232 SBSQ HOSP IP/OBS MODERATE 35: CPT

## 2021-01-04 PROCEDURE — 99233 SBSQ HOSP IP/OBS HIGH 50: CPT

## 2021-01-04 RX ORDER — WARFARIN SODIUM 2.5 MG/1
10 TABLET ORAL ONCE
Refills: 0 | Status: COMPLETED | OUTPATIENT
Start: 2021-01-04 | End: 2021-01-04

## 2021-01-04 RX ADMIN — Medication 2: at 09:01

## 2021-01-04 RX ADMIN — ENOXAPARIN SODIUM 100 MILLIGRAM(S): 100 INJECTION SUBCUTANEOUS at 22:01

## 2021-01-04 RX ADMIN — WARFARIN SODIUM 10 MILLIGRAM(S): 2.5 TABLET ORAL at 22:01

## 2021-01-04 RX ADMIN — ENOXAPARIN SODIUM 100 MILLIGRAM(S): 100 INJECTION SUBCUTANEOUS at 11:18

## 2021-01-04 RX ADMIN — Medication 2: at 12:27

## 2021-01-04 RX ADMIN — Medication 6 MILLIGRAM(S): at 05:18

## 2021-01-04 RX ADMIN — INSULIN GLARGINE 20 UNIT(S): 100 INJECTION, SOLUTION SUBCUTANEOUS at 22:01

## 2021-01-04 NOTE — PROGRESS NOTE ADULT - SUBJECTIVE AND OBJECTIVE BOX
Patient is a 50y old  Male who presents with a chief complaint of respiratory failure (03 Jan 2021 12:33)    Patient seen and examined at bedside.     ALLERGIES:  No Known Allergies    MEDICATIONS  (STANDING):  dexAMETHasone  Injectable 6 milliGRAM(s) IV Push daily  enoxaparin Injectable 100 milliGRAM(s) SubCutaneous every 12 hours  insulin glargine Injectable (LANTUS) 20 Unit(s) SubCutaneous at bedtime  insulin lispro (ADMELOG) corrective regimen sliding scale   SubCutaneous three times a day before meals  warfarin 10 milliGRAM(s) Oral once    MEDICATIONS  (PRN):  morphine  - Injectable 1 milliGRAM(s) IV Push every 4 hours PRN Severe Pain (7 - 10)    Vital Signs Last 24 Hrs  T(F): 97.4 (04 Jan 2021 04:00), Max: 98.5 (03 Jan 2021 08:21)  HR: 76 (04 Jan 2021 04:00) (69 - 99)  BP: 117/74 (04 Jan 2021 04:00) (117/74 - 140/89)  RR: 18 (04 Jan 2021 04:00) (18 - 98)  SpO2: 96% (04 Jan 2021 04:00) (92% - 97%)  I&O's Summary    03 Jan 2021 07:01  -  04 Jan 2021 07:00  --------------------------------------------------------  IN: 720 mL / OUT: 300 mL / NET: 420 mL      PHYSICAL EXAM:  General: NAD, A/O x 3, obese  ENT: MMM, no thrush  Neck: Supple, No JVD  Lungs: decreased breath sounds b/l  Cardio: +s1/s2  Abdomen: Soft, Nontender, Nondistended; Bowel sounds present  Extremities: able to move all extremities    LABS:                        13.4   10.12 )-----------( 418      ( 04 Jan 2021 06:44 )             39.8     01-03    x   |  x   |  x   ----------------------------<  x   x    |  x   |  0.93    Ca    8.8      02 Jan 2021 09:40    TPro  6.3  /  Alb  2.7  /  TBili  0.6  /  DBili  0.2  /  AST  45  /  ALT  78  /  AlkPhos  101  01-03    eGFR if Non African American: 95 mL/min/1.73M2 (01-03-21 @ 08:20)  eGFR if African American: 110 mL/min/1.73M2 (01-03-21 @ 08:20)    PT/INR - ( 04 Jan 2021 06:44 )   PT: 14.9 sec;   INR: 1.30 ratio    PTT - ( 04 Jan 2021 06:44 )  PTT:41.1 sec    Procalcitonin, Serum: 0.26 ng/mL (01-01-21 @ 10:35)    Glucose  POCT Blood Glucose.: 187 mg/dL (03 Jan 2021 23:10)  POCT Blood Glucose.: 200 mg/dL (03 Jan 2021 17:07)  POCT Blood Glucose.: 246 mg/dL (03 Jan 2021 10:36)  POCT Blood Glucose.: 223 mg/dL (03 Jan 2021 09:13)    RADIOLOGY & ADDITIONAL TESTS:  < from: CT Angio Chest w/ IV Cont (12.28.20 @ 21:28) >  IMPRESSION:  Bilateral peripheral pulmonary emboli as described.  Commonly reported imaging features of COVID-19 pneumonia.  < end of copied text >    < from: US Duplex Venous Lower Ext Complete, Bilateral (12.27.20 @ 18:31) >  IMPRESSION:  Partial compressibility of the bilateral lower extremity veins in the thigh and calf with very slow, viscous flow bilaterally. Partially occlusive thrombus is not excluded. Repeatexam as clinically indicated.  < end of copied text >    < from: Xray Chest 1 View- PORTABLE-Urgent (12.27.20 @ 16:04) >  IMPRESSION: Multilobar pneumonia.  < end of copied text >

## 2021-01-04 NOTE — PROGRESS NOTE ADULT - SUBJECTIVE AND OBJECTIVE BOX
[facility]  INFECTIOUS DISEASES AND INTERNAL MEDICINE at [place]  =======================================================  Jamie Morales MD  Diplomates American Board of Internal Medicine and Infectious Diseases  Tel: [phone]      Fax: [phone]  =======================================================    EDSON BELLA [id number]    Follow up: COVID 19     feeling better on NC  sats of 93 % on RA      Allergies:  No Known Allergies      REVIEW OF SYSTEMS:  CONSTITUTIONAL:  No Fever or chills  HEENT:  No diplopia or blurred vision.  No earache, sore throat or runny nose.  CARDIOVASCULAR:  No pressure, squeezing, strangling, tightness, heaviness or aching about the chest, neck, axilla or epigastrium.  RESPIRATORY:  No cough,  LESS shortness of breath  GASTROINTESTINAL:  No nausea, vomiting or diarrhea.  GENITOURINARY:  No dysuria, frequency or urgency.   MUSCULOSKELETAL:  no joint aches, no muscle pain  SKIN:  No change in skin, hair or nails.  NEUROLOGIC:  No Headaches, seizures   PSYCHIATRIC:  No disorder of thought or mood.  ENDOCRINE:  No heat or cold intolerance  HEMATOLOGICAL:  No easy bruising or bleeding.       Physical Exam:  GEN: mild respiratory failure   HEENT: normocephalic and atraumatic. EOMI. PERRL.  Anicteric  NECK: Supple.   LUNGS: improved aeration at the bases.,  MORE vesicular breath sounds.   HEART: Regular rate and rhythm  ABDOMEN: Soft, nontender, and nondistended.  Positive bowel sounds.    : No CVA tenderness  EXTREMITIES: Without any edema.  MSK: No joint swelling  NEUROLOGIC:  No Focal Deficits  PSYCHIATRIC: Appropriate affect .  SKIN: No Rash        Vitals:  ============  T(F): 98.3 (04 Jan 2021 15:53), Max: 98.3 (04 Jan 2021 15:53)  HR: 98 (04 Jan 2021 15:53)  BP: 131/81 (04 Jan 2021 15:53)  RR: 18 (04 Jan 2021 15:53)  SpO2: 94% (04 Jan 2021 15:53) (94% - 97%)  temp max in last 48H T(F): , Max: 98.5 (01-03-21 @ 08:21)    =======================================================  Current Antibiotics:    Other medications:  dexAMETHasone  Injectable 6 milliGRAM(s) IV Push daily  enoxaparin Injectable 100 milliGRAM(s) SubCutaneous every 12 hours  insulin glargine Injectable (LANTUS) 20 Unit(s) SubCutaneous at bedtime  insulin lispro (ADMELOG) corrective regimen sliding scale   SubCutaneous three times a day before meals  warfarin 10 milliGRAM(s) Oral once      =======================================================  Labs:                        13.4   10.12 )-----------( 418      ( 04 Jan 2021 06:44 )             39.8      01-03    x   |  x   |  x   ----------------------------<  x   x    |  x   |  0.93      TPro  6.3<L>  /  Alb  2.7<L>  /  TBili  0.6  /  DBili  0.2  /  AST  45<H>  /  ALT  78<H>  /  AlkPhos  101  01-03      Creatinine, Serum: 0.93 mg/dL (01-03-21 @ 08:20)  Creatinine, Serum: 0.91 mg/dL (01-02-21 @ 09:40)  Creatinine, Serum: 0.85 mg/dL (01-01-21 @ 10:35)  Creatinine, Serum: 0.91 mg/dL (12-31-20 @ 08:48)    Procalcitonin, Serum: 0.26 ng/mL (01-01-21 @ 10:35)  Procalcitonin, Serum: 2.34 ng/mL (12-28-20 @ 07:18)  Procalcitonin, Serum: 1.86 ng/mL (12-27-20 @ 15:01)    D-Dimer Assay, Quantitative: 6585 ng/mL DDU (01-01-21 @ 10:35)  D-Dimer Assay, Quantitative: >38438 ng/mL DDU (12-27-20 @ 15:01)    Ferritin, Serum: 991 ng/mL (01-01-21 @ 10:35)  Ferritin, Serum: 962 ng/mL (12-28-20 @ 07:18)  Ferritin, Serum: 839 ng/mL (12-27-20 @ 15:01)    C-Reactive Protein, Serum: 3.01 mg/dL (01-01-21 @ 10:35)  C-Reactive Protein, Serum: 34.44 mg/dL (12-28-20 @ 07:18)  C-Reactive Protein, Serum: 30.97 mg/dL (12-27-20 @ 15:01)    WBC Count: 10.12 K/uL (01-04-21 @ 06:44)  WBC Count: 12.56 K/uL (01-02-21 @ 09:40)  WBC Count: 14.76 K/uL (01-01-21 @ 10:35)  WBC Count: 15.28 K/uL (12-31-20 @ 08:48)      COVID-19 IgG Antibody Interpretation: Positive (12-28-20 @ 12:13)  COVID-19 IgG Antibody Index: 7.13 Index (12-28-20 @ 12:13)  COVID-19 PCR: Detected (12-27-20 @ 15:50)    Lactate Dehydrogenase, Serum: 464 U/L (01-01-21 @ 10:35)    Alkaline Phosphatase, Serum: 101 U/L (01-03-21 @ 08:20)  Alkaline Phosphatase, Serum: 106 U/L (01-02-21 @ 09:40)  Alkaline Phosphatase, Serum: 106 U/L (01-02-21 @ 09:40)  Alkaline Phosphatase, Serum: 101 U/L (01-01-21 @ 10:35)  Alkaline Phosphatase, Serum: 105 U/L (01-01-21 @ 10:35)  Alanine Aminotransferase (ALT/SGPT): 78 U/L (01-03-21 @ 08:20)  Alanine Aminotransferase (ALT/SGPT): 78 U/L (01-02-21 @ 09:40)  Alanine Aminotransferase (ALT/SGPT): 77 U/L (01-02-21 @ 09:40)  Alanine Aminotransferase (ALT/SGPT): 66 U/L (01-01-21 @ 10:35)  Alanine Aminotransferase (ALT/SGPT): 64 U/L (01-01-21 @ 10:35)  Aspartate Aminotransferase (AST/SGOT): 45 U/L (01-03-21 @ 08:20)  Aspartate Aminotransferase (AST/SGOT): 44 U/L (01-02-21 @ 09:40)  Aspartate Aminotransferase (AST/SGOT): 47 U/L (01-02-21 @ 09:40)  Aspartate Aminotransferase (AST/SGOT): 46 U/L (01-01-21 @ 10:35)  Aspartate Aminotransferase (AST/SGOT): 47 U/L (01-01-21 @ 10:35)  Bilirubin Total, Serum: 0.6 mg/dL (01-03-21 @ 08:20)  Bilirubin Total, Serum: 0.7 mg/dL (01-02-21 @ 09:40)  Bilirubin Total, Serum: 0.7 mg/dL (01-02-21 @ 09:40)  Bilirubin Total, Serum: 0.7 mg/dL (01-01-21 @ 10:35)  Bilirubin Total, Serum: 0.6 mg/dL (01-01-21 @ 10:35)  Bilirubin Direct, Serum: 0.2 mg/dL (01-03-21 @ 08:20)  Bilirubin Direct, Serum: 0.2 mg/dL (01-02-21 @ 09:40)  Bilirubin Direct, Serum: 0.2 mg/dL (01-01-21 @ 10:35)

## 2021-01-05 LAB
APTT BLD: 43.5 SEC — HIGH (ref 27.5–35.5)
GLUCOSE BLDC GLUCOMTR-MCNC: 116 MG/DL — HIGH (ref 70–99)
GLUCOSE BLDC GLUCOMTR-MCNC: 125 MG/DL — HIGH (ref 70–99)
GLUCOSE BLDC GLUCOMTR-MCNC: 164 MG/DL — HIGH (ref 70–99)
GLUCOSE BLDC GLUCOMTR-MCNC: 228 MG/DL — HIGH (ref 70–99)
HCT VFR BLD CALC: 39.4 % — SIGNIFICANT CHANGE UP (ref 39–50)
HGB BLD-MCNC: 13.1 G/DL — SIGNIFICANT CHANGE UP (ref 13–17)
INR BLD: 1.33 RATIO — HIGH (ref 0.88–1.16)
MCHC RBC-ENTMCNC: 30.8 PG — SIGNIFICANT CHANGE UP (ref 27–34)
MCHC RBC-ENTMCNC: 33.2 GM/DL — SIGNIFICANT CHANGE UP (ref 32–36)
MCV RBC AUTO: 92.7 FL — SIGNIFICANT CHANGE UP (ref 80–100)
PLATELET # BLD AUTO: 393 K/UL — SIGNIFICANT CHANGE UP (ref 150–400)
PROTHROM AB SERPL-ACNC: 15.2 SEC — HIGH (ref 10.6–13.6)
RBC # BLD: 4.25 M/UL — SIGNIFICANT CHANGE UP (ref 4.2–5.8)
RBC # FLD: 13.6 % — SIGNIFICANT CHANGE UP (ref 10.3–14.5)
WBC # BLD: 11.54 K/UL — HIGH (ref 3.8–10.5)
WBC # FLD AUTO: 11.54 K/UL — HIGH (ref 3.8–10.5)

## 2021-01-05 PROCEDURE — 99232 SBSQ HOSP IP/OBS MODERATE 35: CPT

## 2021-01-05 PROCEDURE — 99233 SBSQ HOSP IP/OBS HIGH 50: CPT

## 2021-01-05 RX ORDER — APIXABAN 2.5 MG/1
10 TABLET, FILM COATED ORAL EVERY 12 HOURS
Refills: 0 | Status: DISCONTINUED | OUTPATIENT
Start: 2021-01-05 | End: 2021-01-06

## 2021-01-05 RX ORDER — APIXABAN 2.5 MG/1
1 TABLET, FILM COATED ORAL
Qty: 70 | Refills: 0
Start: 2021-01-05 | End: 2021-02-03

## 2021-01-05 RX ORDER — WARFARIN SODIUM 2.5 MG/1
10 TABLET ORAL ONCE
Refills: 0 | Status: DISCONTINUED | OUTPATIENT
Start: 2021-01-05 | End: 2021-01-05

## 2021-01-05 RX ORDER — LOPERAMIDE HCL 2 MG
2 TABLET ORAL EVERY 6 HOURS
Refills: 0 | Status: DISCONTINUED | OUTPATIENT
Start: 2021-01-05 | End: 2021-01-06

## 2021-01-05 RX ORDER — ACETAMINOPHEN 500 MG
650 TABLET ORAL EVERY 6 HOURS
Refills: 0 | Status: DISCONTINUED | OUTPATIENT
Start: 2021-01-05 | End: 2021-01-06

## 2021-01-05 RX ADMIN — APIXABAN 10 MILLIGRAM(S): 2.5 TABLET, FILM COATED ORAL at 23:25

## 2021-01-05 RX ADMIN — ENOXAPARIN SODIUM 100 MILLIGRAM(S): 100 INJECTION SUBCUTANEOUS at 10:15

## 2021-01-05 RX ADMIN — Medication 2 MILLIGRAM(S): at 15:29

## 2021-01-05 RX ADMIN — INSULIN GLARGINE 20 UNIT(S): 100 INJECTION, SOLUTION SUBCUTANEOUS at 23:26

## 2021-01-05 RX ADMIN — Medication 6 MILLIGRAM(S): at 05:30

## 2021-01-05 RX ADMIN — Medication 4: at 11:19

## 2021-01-05 NOTE — PROGRESS NOTE ADULT - SUBJECTIVE AND OBJECTIVE BOX
[facility]  INFECTIOUS DISEASES AND INTERNAL MEDICINE at [place]  =======================================================  Jamie Morales MD  Diplomates American Board of Internal Medicine and Infectious Diseases  Tel: [phone]      Fax: [phone]  =======================================================    EDSON BELLA [id number]  Follow up: COVID 19   feeling better on NC  sats of 97 % on RA      Allergies:  No Known Allergies      REVIEW OF SYSTEMS:  CONSTITUTIONAL:  No Fever or chills  HEENT:  No diplopia or blurred vision.  No earache, sore throat or runny nose.  CARDIOVASCULAR:  No pressure, squeezing, strangling, tightness, heaviness or aching about the chest, neck, axilla or epigastrium.  RESPIRATORY:  No cough,  LESS shortness of breath  GASTROINTESTINAL:  No nausea, vomiting or diarrhea.  GENITOURINARY:  No dysuria, frequency or urgency.   MUSCULOSKELETAL:  no joint aches, no muscle pain  SKIN:  No change in skin, hair or nails.  NEUROLOGIC:  No Headaches, seizures   PSYCHIATRIC:  No disorder of thought or mood.  ENDOCRINE:  No heat or cold intolerance  HEMATOLOGICAL:  No easy bruising or bleeding.       Physical Exam:  GEN: mild respiratory failure   HEENT: normocephalic and atraumatic. EOMI. PERRL.  Anicteric  NECK: Supple.   LUNGS:  good air entry.  scattered rhonchi  HEART: Regular rate and rhythm  ABDOMEN: Soft, nontender, and nondistended.  Positive bowel sounds.    : No CVA tenderness  EXTREMITIES: Without any edema.  MSK: No joint swelling  NEUROLOGIC:  No Focal Deficits  PSYCHIATRIC: Appropriate affect .  SKIN: No Rash      Vitals:  ============  T(F): 98.1 (05 Jan 2021 04:00), Max: 98.3 (04 Jan 2021 15:53)  HR: 74 (05 Jan 2021 04:00)  BP: 132/83 (05 Jan 2021 04:00)  RR: 18 (05 Jan 2021 04:00)  SpO2: 97% (05 Jan 2021 04:00) (94% - 97%)  temp max in last 48H T(F): , Max: 98.3 (01-04-21 @ 15:53)    =======================================================  Current Antibiotics:    Other medications:  dexAMETHasone  Injectable 6 milliGRAM(s) IV Push daily  enoxaparin Injectable 100 milliGRAM(s) SubCutaneous every 12 hours  insulin glargine Injectable (LANTUS) 20 Unit(s) SubCutaneous at bedtime  insulin lispro (ADMELOG) corrective regimen sliding scale   SubCutaneous three times a day before meals    =======================================================  Labs:                        13.1   11.54 )-----------( 393      ( 05 Jan 2021 05:57 )             39.4             Creatinine, Serum: 0.93 mg/dL (01-03-21 @ 08:20)  Creatinine, Serum: 0.91 mg/dL (01-02-21 @ 09:40)  Creatinine, Serum: 0.85 mg/dL (01-01-21 @ 10:35)    Procalcitonin, Serum: 0.26 ng/mL (01-01-21 @ 10:35)  Procalcitonin, Serum: 2.34 ng/mL (12-28-20 @ 07:18)  Procalcitonin, Serum: 1.86 ng/mL (12-27-20 @ 15:01)    D-Dimer Assay, Quantitative: 6585 ng/mL DDU (01-01-21 @ 10:35)  D-Dimer Assay, Quantitative: >22344 ng/mL DDU (12-27-20 @ 15:01)    Ferritin, Serum: 991 ng/mL (01-01-21 @ 10:35)  Ferritin, Serum: 962 ng/mL (12-28-20 @ 07:18)  Ferritin, Serum: 839 ng/mL (12-27-20 @ 15:01)    C-Reactive Protein, Serum: 3.01 mg/dL (01-01-21 @ 10:35)  C-Reactive Protein, Serum: 34.44 mg/dL (12-28-20 @ 07:18)  C-Reactive Protein, Serum: 30.97 mg/dL (12-27-20 @ 15:01)    WBC Count: 11.54 K/uL (01-05-21 @ 05:57)  WBC Count: 10.12 K/uL (01-04-21 @ 06:44)  WBC Count: 12.56 K/uL (01-02-21 @ 09:40)  WBC Count: 14.76 K/uL (01-01-21 @ 10:35)      COVID-19 IgG Antibody Interpretation: Positive (12-28-20 @ 12:13)  COVID-19 IgG Antibody Index: 7.13 Index (12-28-20 @ 12:13)  COVID-19 PCR: Detected (12-27-20 @ 15:50)    Lactate Dehydrogenase, Serum: 464 U/L (01-01-21 @ 10:35)    Alkaline Phosphatase, Serum: 101 U/L (01-03-21 @ 08:20)  Alkaline Phosphatase, Serum: 106 U/L (01-02-21 @ 09:40)  Alkaline Phosphatase, Serum: 106 U/L (01-02-21 @ 09:40)  Alkaline Phosphatase, Serum: 101 U/L (01-01-21 @ 10:35)  Alkaline Phosphatase, Serum: 105 U/L (01-01-21 @ 10:35)  Alanine Aminotransferase (ALT/SGPT): 78 U/L (01-03-21 @ 08:20)  Alanine Aminotransferase (ALT/SGPT): 78 U/L (01-02-21 @ 09:40)  Alanine Aminotransferase (ALT/SGPT): 77 U/L (01-02-21 @ 09:40)  Alanine Aminotransferase (ALT/SGPT): 66 U/L (01-01-21 @ 10:35)  Alanine Aminotransferase (ALT/SGPT): 64 U/L (01-01-21 @ 10:35)  Aspartate Aminotransferase (AST/SGOT): 45 U/L (01-03-21 @ 08:20)  Aspartate Aminotransferase (AST/SGOT): 44 U/L (01-02-21 @ 09:40)  Aspartate Aminotransferase (AST/SGOT): 47 U/L (01-02-21 @ 09:40)  Aspartate Aminotransferase (AST/SGOT): 46 U/L (01-01-21 @ 10:35)  Aspartate Aminotransferase (AST/SGOT): 47 U/L (01-01-21 @ 10:35)  Bilirubin Total, Serum: 0.6 mg/dL (01-03-21 @ 08:20)  Bilirubin Total, Serum: 0.7 mg/dL (01-02-21 @ 09:40)  Bilirubin Total, Serum: 0.7 mg/dL (01-02-21 @ 09:40)  Bilirubin Total, Serum: 0.7 mg/dL (01-01-21 @ 10:35)  Bilirubin Total, Serum: 0.6 mg/dL (01-01-21 @ 10:35)  Bilirubin Direct, Serum: 0.2 mg/dL (01-03-21 @ 08:20)  Bilirubin Direct, Serum: 0.2 mg/dL (01-02-21 @ 09:40)  Bilirubin Direct, Serum: 0.2 mg/dL (01-01-21 @ 10:35)

## 2021-01-05 NOTE — CHART NOTE - NSCHARTNOTEFT_GEN_A_CORE
Source: Patient [ ]  Family [ ]   other [x ] EMR     Current Diet: Diet, Consistent Carbohydrate w/Evening Snack (12-29-20 @ 12:34)    PO intake:  < 50% [ ]   50-75%  [ ]   %  [x ]  other :    Source for PO intake [ ] Patient [ ] family [x ] chart [ ] staff [ ] other    Current Weight:   12/28: 104 kg     % Weight Change: N/A No new wt changes at this time.     Pertinent Medications: MEDICATIONS  (STANDING):  dexAMETHasone  Injectable 6 milliGRAM(s) IV Push daily  enoxaparin Injectable 100 milliGRAM(s) SubCutaneous every 12 hours  insulin glargine Injectable (LANTUS) 20 Unit(s) SubCutaneous at bedtime  insulin lispro (ADMELOG) corrective regimen sliding scale   SubCutaneous three times a day before meals    MEDICATIONS  (PRN):  morphine  - Injectable 1 milliGRAM(s) IV Push every 4 hours PRN Severe Pain (7 - 10)    Pertinent Labs: CBC Full  -  ( 05 Jan 2021 05:57 )  WBC Count : 11.54 K/uL  RBC Count : 4.25 M/uL  Hemoglobin : 13.1 g/dL  Hematocrit : 39.4 %  Platelet Count - Automated : 393 K/uL  Mean Cell Volume : 92.7 fl  Mean Cell Hemoglobin : 30.8 pg  Mean Cell Hemoglobin Concentration : 33.2 gm/dL  Auto Neutrophil # : x  Auto Lymphocyte # : x  Auto Monocyte # : x  Auto Eosinophil # : x  Auto Basophil # : x  Auto Neutrophil % : x  Auto Lymphocyte % : x  Auto Monocyte % : x  Auto Eosinophil % : x  Auto Basophil % : x        Skin: No breakdown noted     Estimated Needs:   [x ] no change since previous assessment  [ ] recalculated:     Current Nutrition Diagnosis:  Pt remains at high nutrition risk secondary to increased nutrient needs related to increased physiologic demand for nutrient as evidenced by admission with hypoxic respiratory failure secondary to COVID 19, now requiring supplemental 02 as needed. Pt now consuming 100% at meals, tolerating well. Recommendations below:     Recommendations:   1. RX: MVI and Vit. D supplementation daily   2. Check wt daily to monitor trends  3. Monitor Glucose levels, LFT's     Monitoring and Evaluation:   [x ] PO intake [x ] Tolerance to diet prescription [X] Weights  [X] Follow up per protocol [X] Labs:

## 2021-01-05 NOTE — PROGRESS NOTE ADULT - SUBJECTIVE AND OBJECTIVE BOX
Patient is a 50y old  Male who presents with a chief complaint of hypoxia,  respiratory failure, +COVID. Patient was seen and examined , Patient on 2 L NC O2 sat 94%, becomes moderately SOB with activity. No overnight events or additional patient complaints, except for loose formed stools.     Allergies:  No Known Allergies      REVIEW OF SYSTEMS:  CONSTITUTIONAL:  No Fever or chills  HEENT:  No diplopia or blurred vision.  No earache, sore throat or runny nose.  CARDIOVASCULAR:  No pressure, squeezing, strangling, tightness, heaviness or aching about the chest, neck, axilla or epigastrium.  RESPIRATORY:  No cough, + shortness of breath  GASTROINTESTINAL:  No nausea, vomiting or diarrhea.  GENITOURINARY:  No dysuria, frequency or urgency.   MUSCULOSKELETAL:  no joint aches, no muscle pain  SKIN:  No change in skin, hair or nails.  NEUROLOGIC:  No Headaches, seizures   PSYCHIATRIC:  No disorder of thought or mood.  ENDOCRINE:  No heat or cold intolerance  HEMATOLOGICAL:  No easy bruising or bleeding.    Vital Signs Last 24 Hrs  T(C): 36.7 (05 Jan 2021 04:00), Max: 36.8 (04 Jan 2021 15:53)  T(F): 98.1 (05 Jan 2021 04:00), Max: 98.3 (04 Jan 2021 15:53)  HR: 74 (05 Jan 2021 04:00) (74 - 98)  BP: 132/83 (05 Jan 2021 04:00) (131/81 - 132/83)  BP(mean): --  RR: 18 (05 Jan 2021 04:00) (18 - 18)  SpO2: 97% (05 Jan 2021 04:00) (94% - 97%)    PHYSICAL EXAM:  General: NAD, A/O x 3, obese  ENT: MMM, no thrush  Neck: Supple, No JVD  Lungs: decreased breath sounds b/l  Cardio: +s1/s2  Abdomen: Soft, Nontender, Nondistended; Bowel sounds hyperactive  Extremities: able to move all extremities      LABS:                          13.1   11.54 )-----------( 393      ( 05 Jan 2021 05:57 )             39.4           PT/INR - ( 05 Jan 2021 05:57 )   PT: 15.2 sec;   INR: 1.33 ratio         PTT - ( 05 Jan 2021 05:57 )  PTT:43.5 sec    CAPILLARY BLOOD GLUCOSE      POCT Blood Glucose.: 228 mg/dL (05 Jan 2021 11:16)  POCT Blood Glucose.: 125 mg/dL (05 Jan 2021 07:37)  POCT Blood Glucose.: 188 mg/dL (04 Jan 2021 21:59)  POCT Blood Glucose.: 150 mg/dL (04 Jan 2021 17:02)      MEDICATIONS  (STANDING):  dexAMETHasone  Injectable 6 milliGRAM(s) IV Push daily  enoxaparin Injectable 100 milliGRAM(s) SubCutaneous every 12 hours  insulin glargine Injectable (LANTUS) 20 Unit(s) SubCutaneous at bedtime  insulin lispro (ADMELOG) corrective regimen sliding scale   SubCutaneous three times a day before meals    MEDICATIONS  (PRN):  loperamide 2 milliGRAM(s) Oral every 6 hours PRN Diarrhea  morphine  - Injectable 1 milliGRAM(s) IV Push every 4 hours PRN Severe Pain (7 - 10)           Patient is a 50y old  Male who presents with a chief complaint of hypoxia,  respiratory failure, +COVID. Patient was seen and examined , Patient on 2 L NC O2 sat 94%, becomes moderately SOB with activity. No overnight events or additional patient complaints, except for loose formed stools.     Allergies:  No Known Allergies      REVIEW OF SYSTEMS:  CONSTITUTIONAL:  No Fever or chills  HEENT:  No diplopia or blurred vision.  No earache, sore throat or runny nose.  CARDIOVASCULAR:  No pressure, squeezing, strangling, tightness, heaviness or aching about the chest, neck, axilla or epigastrium.  RESPIRATORY:  No cough, + shortness of breath  GASTROINTESTINAL:  No nausea, vomiting or diarrhea.  GENITOURINARY:  No dysuria, frequency or urgency.   MUSCULOSKELETAL:  no joint aches, no muscle pain  SKIN:  No change in skin, hair or nails.  NEUROLOGIC:  No Headaches, seizures   PSYCHIATRIC:  No disorder of thought or mood.  ENDOCRINE:  No heat or cold intolerance  HEMATOLOGICAL:  No easy bruising or bleeding.    Vital Signs Last 24 Hrs  T(C): 36.7 (05 Jan 2021 04:00), Max: 36.8 (04 Jan 2021 15:53)  T(F): 98.1 (05 Jan 2021 04:00), Max: 98.3 (04 Jan 2021 15:53)  HR: 74 (05 Jan 2021 04:00) (74 - 98)  BP: 132/83 (05 Jan 2021 04:00) (131/81 - 132/83)  BP(mean): --  RR: 18 (05 Jan 2021 04:00) (18 - 18)  SpO2: 97% (05 Jan 2021 04:00) (94% - 97%)    PHYSICAL EXAM:  General: NAD, A/O x 3, obese  ENT: MMM, no thrush  Neck: Supple, No JVD  Lungs: decreased breath sounds b/l  Cardio: +s1/s2  Abdomen: Soft, Nontender, Nondistended; Bowel sounds hyperactive  Extremities: able to move all extremities      LABS:                          13.1   11.54 )-----------( 393      ( 05 Jan 2021 05:57 )             39.4           PT/INR - ( 05 Jan 2021 05:57 )   PT: 15.2 sec;   INR: 1.33 ratio         PTT - ( 05 Jan 2021 05:57 )  PTT:43.5 sec    CAPILLARY BLOOD GLUCOSE      POCT Blood Glucose.: 228 mg/dL (05 Jan 2021 11:16)  POCT Blood Glucose.: 125 mg/dL (05 Jan 2021 07:37)  POCT Blood Glucose.: 188 mg/dL (04 Jan 2021 21:59)  POCT Blood Glucose.: 150 mg/dL (04 Jan 2021 17:02)      MEDICATIONS  (STANDING):  dexAMETHasone  Injectable 6 milliGRAM(s) IV Push daily  enoxaparin Injectable 100 milliGRAM(s) SubCutaneous every 12 hours  insulin glargine Injectable (LANTUS) 20 Unit(s) SubCutaneous at bedtime  insulin lispro (ADMELOG) corrective regimen sliding scale   SubCutaneous three times a day before meals  warfarin 10 milliGRAM(s) Oral once    MEDICATIONS  (PRN):  loperamide 2 milliGRAM(s) Oral every 6 hours PRN Diarrhea  morphine  - Injectable 1 milliGRAM(s) IV Push every 4 hours PRN Severe Pain (7 - 10)

## 2021-01-05 NOTE — PROGRESS NOTE ADULT - ATTENDING COMMENTS
A total of 35 minutes were spent on the entire encounter. Greater than 50% of the time was spent reviewing labs, notes, orders, radiographic studies, as well as counseling and coordinating care with the relevant multidisciplinary team, including with the primary and consulting providers.
I have personally seen and examined patient on the above date.  I discussed the case with DANIELA Mena and I agree with findings and plan as detailed per note above, which I have amended where appropriate.      patient stating has medicaid rx for eliquis sent to pharmacy to see if covered    d/w clinical pharmacy will give coumadin 10 today

## 2021-01-06 ENCOUNTER — TRANSCRIPTION ENCOUNTER (OUTPATIENT)
Age: 51
End: 2021-01-06

## 2021-01-06 VITALS
RESPIRATION RATE: 20 BRPM | DIASTOLIC BLOOD PRESSURE: 84 MMHG | TEMPERATURE: 98 F | SYSTOLIC BLOOD PRESSURE: 129 MMHG | HEART RATE: 92 BPM | OXYGEN SATURATION: 95 %

## 2021-01-06 LAB
ANION GAP SERPL CALC-SCNC: 11 MMOL/L — SIGNIFICANT CHANGE UP (ref 5–17)
BUN SERPL-MCNC: 27 MG/DL — HIGH (ref 8–20)
CALCIUM SERPL-MCNC: 8.6 MG/DL — SIGNIFICANT CHANGE UP (ref 8.6–10.2)
CHLORIDE SERPL-SCNC: 101 MMOL/L — SIGNIFICANT CHANGE UP (ref 98–107)
CO2 SERPL-SCNC: 24 MMOL/L — SIGNIFICANT CHANGE UP (ref 22–29)
CREAT SERPL-MCNC: 0.89 MG/DL — SIGNIFICANT CHANGE UP (ref 0.5–1.3)
GLUCOSE BLDC GLUCOMTR-MCNC: 142 MG/DL — HIGH (ref 70–99)
GLUCOSE BLDC GLUCOMTR-MCNC: 169 MG/DL — HIGH (ref 70–99)
GLUCOSE BLDC GLUCOMTR-MCNC: 203 MG/DL — HIGH (ref 70–99)
GLUCOSE SERPL-MCNC: 123 MG/DL — HIGH (ref 70–99)
HCT VFR BLD CALC: 38 % — LOW (ref 39–50)
HGB BLD-MCNC: 12.7 G/DL — LOW (ref 13–17)
MCHC RBC-ENTMCNC: 31 PG — SIGNIFICANT CHANGE UP (ref 27–34)
MCHC RBC-ENTMCNC: 33.4 GM/DL — SIGNIFICANT CHANGE UP (ref 32–36)
MCV RBC AUTO: 92.7 FL — SIGNIFICANT CHANGE UP (ref 80–100)
PLATELET # BLD AUTO: 374 K/UL — SIGNIFICANT CHANGE UP (ref 150–400)
POTASSIUM SERPL-MCNC: 4.2 MMOL/L — SIGNIFICANT CHANGE UP (ref 3.5–5.3)
POTASSIUM SERPL-SCNC: 4.2 MMOL/L — SIGNIFICANT CHANGE UP (ref 3.5–5.3)
RBC # BLD: 4.1 M/UL — LOW (ref 4.2–5.8)
RBC # FLD: 13.8 % — SIGNIFICANT CHANGE UP (ref 10.3–14.5)
SODIUM SERPL-SCNC: 136 MMOL/L — SIGNIFICANT CHANGE UP (ref 135–145)
WBC # BLD: 11.42 K/UL — HIGH (ref 3.8–10.5)
WBC # FLD AUTO: 11.42 K/UL — HIGH (ref 3.8–10.5)

## 2021-01-06 PROCEDURE — 84145 PROCALCITONIN (PCT): CPT

## 2021-01-06 PROCEDURE — 87449 NOS EACH ORGANISM AG IA: CPT

## 2021-01-06 PROCEDURE — 71045 X-RAY EXAM CHEST 1 VIEW: CPT

## 2021-01-06 PROCEDURE — 99291 CRITICAL CARE FIRST HOUR: CPT | Mod: 25

## 2021-01-06 PROCEDURE — U0003: CPT

## 2021-01-06 PROCEDURE — 93005 ELECTROCARDIOGRAM TRACING: CPT

## 2021-01-06 PROCEDURE — 83036 HEMOGLOBIN GLYCOSYLATED A1C: CPT

## 2021-01-06 PROCEDURE — 99239 HOSP IP/OBS DSCHRG MGMT >30: CPT

## 2021-01-06 PROCEDURE — 85610 PROTHROMBIN TIME: CPT

## 2021-01-06 PROCEDURE — 36415 COLL VENOUS BLD VENIPUNCTURE: CPT

## 2021-01-06 PROCEDURE — 71275 CT ANGIOGRAPHY CHEST: CPT

## 2021-01-06 PROCEDURE — 84100 ASSAY OF PHOSPHORUS: CPT

## 2021-01-06 PROCEDURE — 93970 EXTREMITY STUDY: CPT

## 2021-01-06 PROCEDURE — 82565 ASSAY OF CREATININE: CPT

## 2021-01-06 PROCEDURE — 85730 THROMBOPLASTIN TIME PARTIAL: CPT

## 2021-01-06 PROCEDURE — 85025 COMPLETE CBC W/AUTO DIFF WBC: CPT

## 2021-01-06 PROCEDURE — 85027 COMPLETE CBC AUTOMATED: CPT

## 2021-01-06 PROCEDURE — 83605 ASSAY OF LACTIC ACID: CPT

## 2021-01-06 PROCEDURE — 82728 ASSAY OF FERRITIN: CPT

## 2021-01-06 PROCEDURE — 82962 GLUCOSE BLOOD TEST: CPT

## 2021-01-06 PROCEDURE — 94660 CPAP INITIATION&MGMT: CPT

## 2021-01-06 PROCEDURE — 80053 COMPREHEN METABOLIC PANEL: CPT

## 2021-01-06 PROCEDURE — 96374 THER/PROPH/DIAG INJ IV PUSH: CPT

## 2021-01-06 PROCEDURE — 80076 HEPATIC FUNCTION PANEL: CPT

## 2021-01-06 PROCEDURE — 85379 FIBRIN DEGRADATION QUANT: CPT

## 2021-01-06 PROCEDURE — 84484 ASSAY OF TROPONIN QUANT: CPT

## 2021-01-06 PROCEDURE — 86140 C-REACTIVE PROTEIN: CPT

## 2021-01-06 PROCEDURE — 83735 ASSAY OF MAGNESIUM: CPT

## 2021-01-06 PROCEDURE — 86769 SARS-COV-2 COVID-19 ANTIBODY: CPT

## 2021-01-06 PROCEDURE — 83880 ASSAY OF NATRIURETIC PEPTIDE: CPT

## 2021-01-06 PROCEDURE — 80048 BASIC METABOLIC PNL TOTAL CA: CPT

## 2021-01-06 PROCEDURE — 83615 LACTATE (LD) (LDH) ENZYME: CPT

## 2021-01-06 RX ORDER — ENOXAPARIN SODIUM 100 MG/ML
20 INJECTION SUBCUTANEOUS
Qty: 1 | Refills: 0
Start: 2021-01-06 | End: 2021-01-10

## 2021-01-06 RX ORDER — METFORMIN HYDROCHLORIDE 850 MG/1
1 TABLET ORAL
Qty: 60 | Refills: 0
Start: 2021-01-06 | End: 2021-02-04

## 2021-01-06 RX ORDER — ISOPROPYL ALCOHOL, BENZOCAINE .7; .06 ML/ML; ML/ML
1 SWAB TOPICAL
Qty: 100 | Refills: 1
Start: 2021-01-06 | End: 2021-02-24

## 2021-01-06 RX ORDER — DEXAMETHASONE 0.5 MG/5ML
1 ELIXIR ORAL
Qty: 5 | Refills: 0
Start: 2021-01-06 | End: 2021-01-10

## 2021-01-06 RX ADMIN — Medication 6 MILLIGRAM(S): at 05:54

## 2021-01-06 RX ADMIN — Medication 2: at 08:12

## 2021-01-06 RX ADMIN — Medication 4: at 11:51

## 2021-01-06 RX ADMIN — APIXABAN 10 MILLIGRAM(S): 2.5 TABLET, FILM COATED ORAL at 09:59

## 2021-01-06 NOTE — DISCHARGE NOTE PROVIDER - HOSPITAL COURSE
49 y/o obese [place] with no known PMH presented to the ED with fever, shortness of breath and fatigue.  Found to be hypoxic, saturating 68% on room air, placed on BIPAP in ED.  CXR showed diffuse multifocal infiltrates.  Patient reports having symptoms for several days, worsening the day of presentation.  Admitted to MICU for COVID 19 PNA and CT showed B/L PE. Pt on full dose anticoagulation and remedesivir. Pt on High flow oxygen. Pt downgraded to medical service for further management. Patient currently on 2 LNC O2 and stable for d/c home on o2.    Time spent on patients discharge 32 minutes

## 2021-01-06 NOTE — PROGRESS NOTE ADULT - SUBJECTIVE AND OBJECTIVE BOX
Patient is a 50y old  Male who presents with a chief complaint of respiratory failure (06 Jan 2021 08:28)    Patient seen and examined at bedside.      ALLERGIES:  No Known Allergies    MEDICATIONS  (STANDING):  apixaban 10 milliGRAM(s) Oral every 12 hours  insulin glargine Injectable (LANTUS) 20 Unit(s) SubCutaneous at bedtime  insulin lispro (ADMELOG) corrective regimen sliding scale   SubCutaneous three times a day before meals    MEDICATIONS  (PRN):  acetaminophen   Tablet .. 650 milliGRAM(s) Oral every 6 hours PRN Moderate Pain (4 - 6)  loperamide 2 milliGRAM(s) Oral every 6 hours PRN Diarrhea    Vital Signs Last 24 Hrs  T(F): 97.5 (06 Jan 2021 05:10), Max: 98.6 (05 Jan 2021 23:26)  HR: 82 (06 Jan 2021 05:10) (82 - 92)  BP: 106/67 (06 Jan 2021 05:10) (106/67 - 130/84)  RR: 18 (06 Jan 2021 05:10) (18 - 19)  SpO2: 96% (06 Jan 2021 05:10) (96% - 97%)  I&O's Summary    05 Jan 2021 07:01  -  06 Jan 2021 07:00  --------------------------------------------------------  IN: 480 mL / OUT: 600 mL / NET: -120 mL      PHYSICAL EXAM:  General: NAD, A/O x 3, obese  ENT: MMM, no thrush  Neck: Supple, No JVD  Lungs: decreased breath sounds b/l  Cardio: +s1/s2  Abdomen: Soft, Nontender, Nondistended; Bowel sounds present  Extremities: able to move all extremities    LABS:                        12.7   11.42 )-----------( 374      ( 06 Jan 2021 06:19 )             38.0     01-06    136  |  101  |  27.0  ----------------------------<  123  4.2   |  24.0  |  0.89    Ca    8.6      06 Jan 2021 06:19    eGFR if Non African American: 99 mL/min/1.73M2 (01-06-21 @ 06:19)  eGFR if African American: 115 mL/min/1.73M2 (01-06-21 @ 06:19)    PT/INR - ( 05 Jan 2021 05:57 )   PT: 15.2 sec;   INR: 1.33 ratio    PTT - ( 05 Jan 2021 05:57 )  PTT:43.5 sec    Glucose  POCT Blood Glucose.: 169 mg/dL (06 Jan 2021 08:07)  POCT Blood Glucose.: 164 mg/dL (05 Jan 2021 23:25)  POCT Blood Glucose.: 116 mg/dL (05 Jan 2021 16:53)  POCT Blood Glucose.: 228 mg/dL (05 Jan 2021 11:16)    RADIOLOGY & ADDITIONAL TESTS:  < from: CT Angio Chest w/ IV Cont (12.28.20 @ 21:28) >  IMPRESSION:  Bilateral peripheral pulmonary emboli as described.  Commonly reported imaging features of COVID-19 pneumonia.  < end of copied text >    < from: US Duplex Venous Lower Ext Complete, Bilateral (12.27.20 @ 18:31) >  IMPRESSION:  Partial compressibility of the bilateral lower extremity veins in the thigh and calf with very slow, viscous flow bilaterally. Partially occlusive thrombus is not excluded. Repeatexam as clinically indicated.  < end of copied text >    < from: Xray Chest 1 View- PORTABLE-Urgent (12.27.20 @ 16:04) >  IMPRESSION: Multilobar pneumonia.  < end of copied text >

## 2021-01-06 NOTE — DISCHARGE NOTE PROVIDER - NSDCCPCAREPLAN_GEN_ALL_CORE_FT
PRINCIPAL DISCHARGE DIAGNOSIS  Diagnosis: Acute respiratory failure with hypoxia  Assessment and Plan of Treatment: - 2/2 covid 19 w/ pulmonary embolism  - take medications as rx   - follow up with pcp      SECONDARY DISCHARGE DIAGNOSES  Diagnosis: Type 2 diabetes mellitus  Assessment and Plan of Treatment: - while on decadron for covid (next 5 days) take lantus at night ; after complete decadron stop lantus and start meformin next am. follow up with pcp. please follow diabetic diet and monitor fingerstick blood glucose. thank you

## 2021-01-06 NOTE — PROGRESS NOTE ADULT - REASON FOR ADMISSION
respiratory failure
respiratory failure
Hypoxia, , COVID 19
respiratory failure

## 2021-01-06 NOTE — DISCHARGE NOTE NURSING/CASE MANAGEMENT/SOCIAL WORK - PATIENT PORTAL LINK FT
You can access the FollowMyHealth Patient Portal offered by [facility] by registering at the following website: [email or web address]. By joining [facility]’s FollowMyHealth portal, you will also be able to view your health information using other applications (apps) compatible with our system.

## 2021-01-06 NOTE — DISCHARGE NOTE PROVIDER - NSDCMRMEDTOKEN_GEN_ALL_CORE_FT
alcohol swabs : Apply topically to affected area 4 times a day   dexamethasone 6 mg oral tablet: 1 tab(s) orally once a day   Eliquis Starter Pack for Treatment of DVT and PE 5 mg oral tablet:   glucometer (per patient&#x27;s insurance): Test blood sugars four times a day. Dispense #1 glucometer.  Insulin Pen Needles, 4mm: 1 application subcutaneously 4 times a day. ** Use with insulin pen **   lancets: 1 application subcutaneously 4 times a day   Lantus Solostar Pen 100 units/mL subcutaneous solution: 20 unit(s) subcutaneous once a day (USE WHILE ON DECADRON THEN SWTICH TO METFORMIN)  metFORMIN 500 mg oral tablet: 1 tab(s) orally 2 times a day with meals after stop lantus   test strips (per patient&#x27;s insurance): 1 application subcutaneously 4 times a day. ** Compatible with patient&#x27;s glucometer **

## 2021-01-06 NOTE — PROGRESS NOTE ADULT - PROVIDER SPECIALTY LIST ADULT
Critical Care
Hospitalist
Hospitalist
Critical Care
Infectious Disease
Hospitalist
Infectious Disease
Hospitalist
Hospitalist

## 2021-01-06 NOTE — DISCHARGE NOTE PROVIDER - NSDCFUADDINST_GEN_ALL_CORE_FT
It has been determined that you no longer need hospitalization and can recover while remaining in self-quarantine at home for 14 days. You should follow the prevention steps below until a healthcare provider or [facility] says you can return to your normal activities.  Please remain in quarantine until then. You should restrict activities outside your home except for getting medical care.  Do not go to work, school or public areas.  Avoid using public transportation.  Separate yourself from other people and animals in your home.  Cover your coughs and sneezes.  Clean your hands often. Avoid sharing personal household items. Clean all high touch surfaces. Monitor your symptoms, if you have a medical emergency call 911.

## 2021-01-07 ENCOUNTER — TRANSCRIPTION ENCOUNTER (OUTPATIENT)
Age: 51
End: 2021-01-07

## 2021-01-11 ENCOUNTER — TRANSCRIPTION ENCOUNTER (OUTPATIENT)
Age: 51
End: 2021-01-11

## 2021-01-23 ENCOUNTER — TRANSCRIPTION ENCOUNTER (OUTPATIENT)
Age: 51
End: 2021-01-23

## 2021-02-02 ENCOUNTER — TRANSCRIPTION ENCOUNTER (OUTPATIENT)
Age: 51
End: 2021-02-02

## 2021-02-17 PROBLEM — E66.9 OBESITY, UNSPECIFIED: Chronic | Status: ACTIVE | Noted: 2020-12-27

## 2021-03-08 ENCOUNTER — APPOINTMENT (OUTPATIENT)
Dept: FAMILY MEDICINE | Facility: CLINIC | Age: 51
End: 2021-03-08
Payer: MEDICAID

## 2021-03-08 ENCOUNTER — NON-APPOINTMENT (OUTPATIENT)
Age: 51
End: 2021-03-08

## 2021-03-08 VITALS
HEART RATE: 95 BPM | TEMPERATURE: 97.7 F | DIASTOLIC BLOOD PRESSURE: 88 MMHG | WEIGHT: 257.25 LBS | HEIGHT: 72 IN | SYSTOLIC BLOOD PRESSURE: 143 MMHG | BODY MASS INDEX: 34.84 KG/M2

## 2021-03-08 DIAGNOSIS — Z12.11 ENCOUNTER FOR SCREENING FOR MALIGNANT NEOPLASM OF COLON: ICD-10-CM

## 2021-03-08 DIAGNOSIS — Z11.59 ENCOUNTER FOR SCREENING FOR OTHER VIRAL DISEASES: ICD-10-CM

## 2021-03-08 DIAGNOSIS — Z11.4 ENCOUNTER FOR SCREENING FOR HUMAN IMMUNODEFICIENCY VIRUS [HIV]: ICD-10-CM

## 2021-03-08 DIAGNOSIS — E11.69 TYPE 2 DIABETES MELLITUS WITH OTHER SPECIFIED COMPLICATION: ICD-10-CM

## 2021-03-08 DIAGNOSIS — Z09 ENCOUNTER FOR FOLLOW-UP EXAMINATION AFTER COMPLETED TREATMENT FOR CONDITIONS OTHER THAN MALIGNANT NEOPLASM: ICD-10-CM

## 2021-03-08 PROCEDURE — 99204 OFFICE O/P NEW MOD 45 MIN: CPT | Mod: 25

## 2021-03-08 PROCEDURE — 93000 ELECTROCARDIOGRAM COMPLETE: CPT

## 2021-03-08 PROCEDURE — 99072 ADDL SUPL MATRL&STAF TM PHE: CPT

## 2021-03-08 PROCEDURE — 36415 COLL VENOUS BLD VENIPUNCTURE: CPT

## 2021-03-08 NOTE — PHYSICAL EXAM
[Soft] : abdomen soft [Non Tender] : non-tender [Non-distended] : non-distended [No HSM] : no HSM [Coordination Grossly Intact] : coordination grossly intact [No Focal Deficits] : no focal deficits [Normal Gait] : normal gait [Speech Grossly Normal] : speech grossly normal [Memory Grossly Normal] : memory grossly normal [Normal Mood] : the mood was normal [Normal] : affect was normal and insight and judgment were intact

## 2021-03-11 LAB
ALBUMIN SERPL ELPH-MCNC: 4.7 G/DL
ALP BLD-CCNC: 94 U/L
ALT SERPL-CCNC: 20 U/L
ANION GAP SERPL CALC-SCNC: 10 MMOL/L
AST SERPL-CCNC: 20 U/L
BASOPHILS # BLD AUTO: 0.11 K/UL
BASOPHILS NFR BLD AUTO: 1.4 %
BILIRUB SERPL-MCNC: 0.3 MG/DL
BUN SERPL-MCNC: 14 MG/DL
CALCIUM SERPL-MCNC: 10.1 MG/DL
CHLORIDE SERPL-SCNC: 103 MMOL/L
CHOLEST SERPL-MCNC: 223 MG/DL
CO2 SERPL-SCNC: 27 MMOL/L
CREAT SERPL-MCNC: 1.1 MG/DL
EOSINOPHIL # BLD AUTO: 0.25 K/UL
EOSINOPHIL NFR BLD AUTO: 3.2 %
ESTIMATED AVERAGE GLUCOSE: 146 MG/DL
GLUCOSE SERPL-MCNC: 96 MG/DL
HBA1C MFR BLD HPLC: 6.7 %
HCT VFR BLD CALC: 43.3 %
HCV AB SER QL: NONREACTIVE
HCV S/CO RATIO: 0.1 S/CO
HDLC SERPL-MCNC: 61 MG/DL
HGB BLD-MCNC: 14 G/DL
HIV1+2 AB SPEC QL IA.RAPID: NONREACTIVE
IMM GRANULOCYTES NFR BLD AUTO: 0.5 %
LDLC SERPL CALC-MCNC: 124 MG/DL
LYMPHOCYTES # BLD AUTO: 2.34 K/UL
LYMPHOCYTES NFR BLD AUTO: 30.4 %
MAN DIFF?: NORMAL
MCHC RBC-ENTMCNC: 31.4 PG
MCHC RBC-ENTMCNC: 32.3 GM/DL
MCV RBC AUTO: 97.1 FL
MONOCYTES # BLD AUTO: 0.65 K/UL
MONOCYTES NFR BLD AUTO: 8.4 %
NEUTROPHILS # BLD AUTO: 4.31 K/UL
NEUTROPHILS NFR BLD AUTO: 56.1 %
NONHDLC SERPL-MCNC: 163 MG/DL
PLATELET # BLD AUTO: 320 K/UL
POTASSIUM SERPL-SCNC: 4.5 MMOL/L
PROT SERPL-MCNC: 7.2 G/DL
RBC # BLD: 4.46 M/UL
RBC # FLD: 15.1 %
SODIUM SERPL-SCNC: 140 MMOL/L
TRIGL SERPL-MCNC: 193 MG/DL
TSH SERPL-ACNC: 0.56 UIU/ML
WBC # FLD AUTO: 7.7 K/UL

## 2021-03-11 NOTE — HISTORY OF PRESENT ILLNESS
[FreeTextEntry1] : hospital follow up  [de-identified] : Mr. EDSON BELLA is a 50 year old male with PMH of COVID on dec/2021 complicated by b/l PE, discharged on Eliquis who comes in to the office to establish care. No complaints today. Patient was at the hospital on December/2020, was at the MICU, required BiPAP, was given steroids and Remdesivir but denies being intubated. He was discharged on jan/06/2021 but has not followed up due to insurance reasons. Reports that was sent home with insulin and metformin which he is not taking anymore. Denies history of DM and believes that this was due to him being on steroids. Report tat his blood sugars after discharge have been consistently < 200. HE reports that now he is asymptomatic, he doesn't have complaints today.

## 2021-03-11 NOTE — ADDENDUM
[FreeTextEntry1] : 11:56 AM03/11/2021 Lab work reviewed with patient over the phone. HbA1C improving, he is not on metformin for over a month. Also lipid panel not in target ASCVD risk > 7.4%. Will start statins and reassess patient within 2 weeks. Rest of lab work unremarkable.

## 2021-03-11 NOTE — ASSESSMENT
[FreeTextEntry1] : \par Regarding his hospital follow up 2/2 respiratory failure related to COVID-19 complicated by b/l PE\par As per [facility] records: Patient admitted on dec/27/2020with hypoxic respiratory failure 2/2 COVID-19 PNA, CT showed b/l PE. \par He required supplemental O2 which was successfully down titrated.\par He received Remdesivir and decadron. \par Was started on full dose anticoagulation\par He also had some transaminitis probably 2/2 CVID-19\par Job has a refill on his Eliquis, which he will likely be on for a minimum of 3 months.\par His EKG 12/27 reported  sinus tach. Repeat EKG today: no acute ST or T-wave abnormalities, patient without cardiac symptoms. Referring to cardiology due to risk factors: obesity, DM, recent COVID-19 and elevated HTN\par \par Patient was diagnosed with type 2 DM\par HbA1C 7.13% 12/28/2021\par Was discharged on Lantus 20 UI QHS while he was on the steroids with plan to switch to metformin 500 mg BID but he has not been taking them.\par He doesn't think that has DM, report that his blood sugars were elevated due to the steroids.Reports that his blood sugars are now better controlled on no medications.  I explained to him that the HbA1C means and the ranges\par Will repeat his HbA1C to clarify diagnosis\par \par Regarding elevated Blood pressure\par Denies history of HTN\par In regards to hypertension\par DIscussed  monitoring his blood pressures at home and taking medications as indicated\par Will reassess within 2 weeks\par \par In regards to obesity\par Lifestyle modifications discussed\par Checking lipid panel, TSH\par \par Giving him the Tdap. Will consider Shingrix. Advised to get Flu shot at the pharmacy since we don;t have it at the office.\par Ordering preventive lab work for CPE\par Return to care: within 2 weeks for CPE and blood pressure check or earlier if needed\par Call or return for any questions

## 2021-03-11 NOTE — DATA REVIEWED
[FreeTextEntry1] : Additional time spent outside of H&P in reviewing information including but not limited to previous notes, inpatient records and laboratories for this patient

## 2021-03-22 ENCOUNTER — APPOINTMENT (OUTPATIENT)
Dept: FAMILY MEDICINE | Facility: CLINIC | Age: 51
End: 2021-03-22
Payer: MEDICAID

## 2021-03-22 VITALS
BODY MASS INDEX: 34.81 KG/M2 | SYSTOLIC BLOOD PRESSURE: 140 MMHG | DIASTOLIC BLOOD PRESSURE: 80 MMHG | HEIGHT: 72 IN | WEIGHT: 257 LBS | TEMPERATURE: 97.5 F

## 2021-03-22 DIAGNOSIS — Z81.8 FAMILY HISTORY OF OTHER MENTAL AND BEHAVIORAL DISORDERS: ICD-10-CM

## 2021-03-22 DIAGNOSIS — Z83.3 FAMILY HISTORY OF DIABETES MELLITUS: ICD-10-CM

## 2021-03-22 DIAGNOSIS — Z00.00 ENCOUNTER FOR GENERAL ADULT MEDICAL EXAMINATION W/OUT ABNORMAL FINDINGS: ICD-10-CM

## 2021-03-22 DIAGNOSIS — Z23 ENCOUNTER FOR IMMUNIZATION: ICD-10-CM

## 2021-03-22 DIAGNOSIS — R51.9 HEADACHE, UNSPECIFIED: ICD-10-CM

## 2021-03-22 PROCEDURE — 99072 ADDL SUPL MATRL&STAF TM PHE: CPT

## 2021-03-22 PROCEDURE — 36415 COLL VENOUS BLD VENIPUNCTURE: CPT

## 2021-03-22 PROCEDURE — 90750 HZV VACC RECOMBINANT IM: CPT

## 2021-03-22 PROCEDURE — 90471 IMMUNIZATION ADMIN: CPT

## 2021-03-22 PROCEDURE — 99396 PREV VISIT EST AGE 40-64: CPT | Mod: 25

## 2021-03-22 NOTE — ASSESSMENT
[FreeTextEntry1] : \par In regards annual physical exam: \par Shingrix 1st dose vaccine given, tolerated well. Next dose due in 2 months\par Lab test ordered as noted.\par iFOBT pending. Declines colonoscopy\par EKG done 2 weeks ago, in chart (see previous note). Referred to cardio. No cardiac symptoms at this time\par \par Regarding advanced care planning: I discussed with this patient the different options and importance of advance care planning, including but not limited to health care proxy designation, MOLST forms and/or writing patient's medical decision in this medical note. Please refer to advance care planning session of this note for detail information \par \par In regards to hyperlipidemia and obesity\par Lifestyle modifications discussed\par Recently started on rosuvastatin, tolerating well. Will recheck  LFTs\par Recheck lipi panel within 3 months\par \par Regarding elevated Blood glucose\par Likely pre-DM and not DM. I suspect that his blood sugars were elevated due to steroids and his acute infection back then\par HbA1C trending down, now at 6.7% from 7.13% on 12/28/2021\par Not on antidiabetics \par Lifestyle medications discussed\par \par Regarding borderline elevated Blood pressure\par Was on anti-HTN medications years ago, but patient reports that has lost weight since and also his blood pressure have been normal\par DIscussed monitoring his blood pressures at home and taking medications as indicated\par Will reassess within 2 weeks over the phone\par \par Regarding his PE\par Asymptomatic \par On Eliquis, which he will likely be on for a minimum of 3-6 months.\par To see cardio\par \par Return to care: within  2 weeks for blood pressure check or earlier if needed\par Call or return for any questions

## 2021-03-22 NOTE — DATA REVIEWED
[FreeTextEntry1] : Additional time spent outside of H&P in I reviewing information including but not limited to previous notes and laboratories for this patient

## 2021-03-22 NOTE — PHYSICAL EXAM
[No Acute Distress] : no acute distress [Well Nourished] : well nourished [Well Developed] : well developed [Well-Appearing] : well-appearing [Normal Sclera/Conjunctiva] : normal sclera/conjunctiva [PERRL] : pupils equal round and reactive to light [EOMI] : extraocular movements intact [Normal Outer Ear/Nose] : the outer ears and nose were normal in appearance [Normal Oropharynx] : the oropharynx was normal [Normal TMs] : both tympanic membranes were normal [Normal Nasal Mucosa] : the nasal mucosa was normal [No JVD] : no jugular venous distention [No Lymphadenopathy] : no lymphadenopathy [Supple] : supple [Thyroid Normal, No Nodules] : the thyroid was normal and there were no nodules present [No Respiratory Distress] : no respiratory distress  [No Accessory Muscle Use] : no accessory muscle use [Clear to Auscultation] : lungs were clear to auscultation bilaterally [Normal Rate] : normal rate  [Regular Rhythm] : with a regular rhythm [Normal S1, S2] : normal S1 and S2 [No Murmur] : no murmur heard [No Carotid Bruits] : no carotid bruits [No Abdominal Bruit] : a ~M bruit was not heard ~T in the abdomen [No Varicosities] : no varicosities [Pedal Pulses Present] : the pedal pulses are present [No Edema] : there was no peripheral edema [No Palpable Aorta] : no palpable aorta [No Extremity Clubbing/Cyanosis] : no extremity clubbing/cyanosis [Soft] : abdomen soft [Non Tender] : non-tender [Non-distended] : non-distended [No Masses] : no abdominal mass palpated [No HSM] : no HSM [Normal Bowel Sounds] : normal bowel sounds [Normal Posterior Cervical Nodes] : no posterior cervical lymphadenopathy [Normal Anterior Cervical Nodes] : no anterior cervical lymphadenopathy [No CVA Tenderness] : no CVA  tenderness [No Spinal Tenderness] : no spinal tenderness [No Joint Swelling] : no joint swelling [Grossly Normal Strength/Tone] : grossly normal strength/tone [No Rash] : no rash [Coordination Grossly Intact] : coordination grossly intact [No Focal Deficits] : no focal deficits [Normal Gait] : normal gait [Cranial Nerves Oculomotor (III)] : the extraocular motions were intact [Cranial Nerves Trigeminal (V)] : sensation to the face and masseter strength were intact [Cranial Nerves Facial (VII)] : facial strength was intact bilaterally [Cranial Nerves Accessory (XI - Cranial And Spinal)] : shoulder shrug was intact bilaterally [Sensation Tactile Decrease] : light touch was intact [2+] : left 2+ [Speech Grossly Normal] : speech grossly normal [Normal Affect] : the affect was normal [Normal Mood] : the mood was normal [Normal Insight/Judgement] : insight and judgment were intact [None] : no ulcers in either foot were found [] : with full ROM [Urethral Meatus] : meatus normal [Penis Abnormality] : normal circumcised penis [Scrotum] : the scrotum was normal [Testes Tenderness] : no tenderness of the testes [Testes Mass (___cm)] : there were no testicular masses [FreeTextEntry1] : no inguinal hernia b/l [TWNoteComboBox4] : +2

## 2021-03-22 NOTE — HISTORY OF PRESENT ILLNESS
[FreeTextEntry1] : CPE [de-identified] : Mr. EDSON BELLA is a 50 year old male with PMH of COVID on dec/2021 complicated by b/l PE, discharged on Eliquis; HLD on rosuvastatin who comes in to the office to for CPE. Reports occasional tension headaches that improve with tylenol or Motrin. Patient's blood glucose and HbA1C were elevated while at the hospital months ago but repeat HbA1C is trending down besides patient being off medications for > 1 month. He was started on rosuvastatin 2 weeks ago because of his ASCVD risk > 7.5%, he is tolerating it well. No other complaint today

## 2021-03-22 NOTE — HEALTH RISK ASSESSMENT
[Yes] : Yes [Monthly or less (1 pt)] : Monthly or less (1 point) [1 or 2 (0 pts)] : 1 or 2 (0 points) [Never (0 pts)] : Never (0 points) [No] : In the past 12 months have you used drugs other than those required for medical reasons? No [0] : 2) Feeling down, depressed, or hopeless: Not at all (0) [Patient declined colonoscopy] : Patient declined colonoscopy [With Family] : lives with family [# of Members in Household ___] :  household currently consist of [unfilled] member(s) [Employed] : employed [High School] : high school [Single] : single [Feels Safe at Home] : Feels safe at home [Fully functional (bathing, dressing, toileting, transferring, walking, feeding)] : Fully functional (bathing, dressing, toileting, transferring, walking, feeding) [Fully functional (using the telephone, shopping, preparing meals, housekeeping, doing laundry, using] : Fully functional and needs no help or supervision to perform IADLs (using the telephone, shopping, preparing meals, housekeeping, doing laundry, using transportation, managing medications and managing finances) [Reports normal functional visual acuity (ie: able to read med bottle)] : Reports normal functional visual acuity [Smoke Detector] : smoke detector [Carbon Monoxide Detector] : carbon monoxide detector [Safety elements used in home] : safety elements used in home [Seat Belt] :  uses seat belt [Patient/Caregiver unclear of wishes] : Patient/Caregiver unclear of wishes [] : No [Audit-CScore] : 1 [IQM2Aogex] : 0 [Change in mental status noted] : No change in mental status noted [Sexually Active] : not sexually active [Reports changes in hearing] : Reports no changes in hearing [Reports changes in vision] : Reports no changes in vision [Reports changes in dental health] : Reports no changes in dental health [Guns at Home] : no guns at home [Sunscreen] : does not use sunscreen [Travel to Developing Areas] : does not  travel to developing areas [ColonoscopyComments] : would like to do iFOBT [HIVDate] : 03/08/2021 [HepatitisCDate] : 03/08/2021 [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

## 2021-03-23 LAB
ALBUMIN SERPL ELPH-MCNC: 4.7 G/DL
ALP BLD-CCNC: 93 U/L
ALT SERPL-CCNC: 23 U/L
AST SERPL-CCNC: 22 U/L
BILIRUB DIRECT SERPL-MCNC: 0.1 MG/DL
BILIRUB INDIRECT SERPL-MCNC: 0.4 MG/DL
BILIRUB SERPL-MCNC: 0.5 MG/DL
PROT SERPL-MCNC: 7.3 G/DL

## 2021-04-05 ENCOUNTER — APPOINTMENT (OUTPATIENT)
Dept: FAMILY MEDICINE | Facility: CLINIC | Age: 51
End: 2021-04-05

## 2021-04-07 RX ORDER — APIXABAN 5 MG/1
5 TABLET, FILM COATED ORAL
Qty: 60 | Refills: 1 | Status: ACTIVE | COMMUNITY
Start: 2021-02-03 | End: 1900-01-01

## 2021-05-10 ENCOUNTER — APPOINTMENT (OUTPATIENT)
Dept: CARDIOLOGY | Facility: CLINIC | Age: 51
End: 2021-05-10
Payer: MEDICAID

## 2021-05-10 ENCOUNTER — NON-APPOINTMENT (OUTPATIENT)
Age: 51
End: 2021-05-10

## 2021-05-10 VITALS — BODY MASS INDEX: 37.16 KG/M2 | SYSTOLIC BLOOD PRESSURE: 160 MMHG | DIASTOLIC BLOOD PRESSURE: 82 MMHG | HEIGHT: 72 IN

## 2021-05-10 VITALS
OXYGEN SATURATION: 98 % | BODY MASS INDEX: 37.11 KG/M2 | TEMPERATURE: 97 F | HEART RATE: 82 BPM | HEIGHT: 72 IN | WEIGHT: 274 LBS

## 2021-05-10 DIAGNOSIS — B94.8 SEQUELAE OF OTHER SPECIFIED INFECTIOUS AND PARASITIC DISEASES: ICD-10-CM

## 2021-05-10 DIAGNOSIS — R03.0 ELEVATED BLOOD-PRESSURE READING, W/OUT DIAGNOSIS OF HYPERTENSION: ICD-10-CM

## 2021-05-10 DIAGNOSIS — Z78.9 OTHER SPECIFIED HEALTH STATUS: ICD-10-CM

## 2021-05-10 DIAGNOSIS — E78.5 HYPERLIPIDEMIA, UNSPECIFIED: ICD-10-CM

## 2021-05-10 DIAGNOSIS — E66.9 OBESITY, UNSPECIFIED: ICD-10-CM

## 2021-05-10 DIAGNOSIS — I26.99 OTHER PULMONARY EMBOLISM W/OUT ACUTE COR PULMONALE: ICD-10-CM

## 2021-05-10 DIAGNOSIS — Z60.2 PROBLEMS RELATED TO LIVING ALONE: ICD-10-CM

## 2021-05-10 PROCEDURE — 93000 ELECTROCARDIOGRAM COMPLETE: CPT | Mod: 59

## 2021-05-10 PROCEDURE — 99204 OFFICE O/P NEW MOD 45 MIN: CPT | Mod: 25

## 2021-05-10 PROCEDURE — 99072 ADDL SUPL MATRL&STAF TM PHE: CPT

## 2021-05-10 RX ORDER — APIXABAN 5 MG/1
5 TABLET, FILM COATED ORAL
Qty: 60 | Refills: 1 | Status: COMPLETED | COMMUNITY
Start: 2021-02-03 | End: 2021-05-10

## 2021-05-10 SDOH — SOCIAL STABILITY - SOCIAL INSECURITY: PROBLEMS RELATED TO LIVING ALONE: Z60.2

## 2021-05-10 NOTE — HISTORY OF PRESENT ILLNESS
[FreeTextEntry1] : 50-year-old gentleman who is referred by his PMD for further evaluation post Covid\par .  Patient presented to the hospital in December 2020 with shortness of breath.  He was diagnosed with Covid.  A CAT scan demonstrated bilateral infiltrates as well as bilateral pulmonary embolism.  Patient was on home O2 and now feels better.  He is back to work.  He has no memory issues.  He is on Eliquis is still and denies any side effects from it.\par There is no personal or family history of clotting disorders PE or DVT.\par He is not short of breath any longer.  He is trying to walk more and lose weight.\par He denies any memory changes.  He denies any GI issues.\par Due to hyperlipidemia he has been placed on Crestor.  No side effects from Crestor.\par \par EKG shows a sinus rhythm with normal axis and intervals no ST-T changes.  There is no evidence of RV strain.

## 2021-05-10 NOTE — ASSESSMENT
[FreeTextEntry1] : 50-year-old male with hypertension which has not been treated and hyperlipidemia who had contracted Covid in the beginning of December and was in the hospital for about 8 days requiring medical therapy and home O2.\par He is doing better now.  Patient was also diagnosed with pulmonary embolism and is on Eliquis.\par He denies any family or personal history of PE or DVT.\par His blood pressure is elevated today.  He states that he is under a lot of stress with his mom requiring care with dementia.  Advised to decrease salt intake and lose weight.\par Follow-up with PMD in 3 months to repeat blood pressure and decide on need for medication.  Continue with a statin therapy\par \par .  Repeat CAT scan of chest PE protocol to exclude any further evidence of PE.  We can then discontinue Eliquis and keep the patient on low-dose aspirin for now.\par Since patients with Covid can have exaggerated heart rate and sometimes arrhythmias, I ordered an echocardiogram as well as a Holter monitor.\par If the above testing is unrevealing, patient can follow-up with us in about a year otherwise he will see me earlier..\par Patient was advised to partake in 150 minutes of moderate exercise per week.\par

## 2021-05-10 NOTE — PHYSICAL EXAM
[Normal] : alert and oriented, normal memory [de-identified] : Slight crackles are heard bilateral bases otherwise is clear. [de-identified] : Obese soft and nontender

## 2021-05-14 ENCOUNTER — NON-APPOINTMENT (OUTPATIENT)
Age: 51
End: 2021-05-14

## 2021-06-03 ENCOUNTER — RX RENEWAL (OUTPATIENT)
Age: 51
End: 2021-06-03

## 2021-06-07 ENCOUNTER — APPOINTMENT (OUTPATIENT)
Dept: CT IMAGING | Facility: CLINIC | Age: 51
End: 2021-06-07
Payer: MEDICAID

## 2021-06-07 ENCOUNTER — OUTPATIENT (OUTPATIENT)
Dept: OUTPATIENT SERVICES | Facility: HOSPITAL | Age: 51
LOS: 1 days | End: 2021-06-07
Payer: MEDICAID

## 2021-06-07 ENCOUNTER — APPOINTMENT (OUTPATIENT)
Dept: CARDIOLOGY | Facility: CLINIC | Age: 51
End: 2021-06-07

## 2021-06-07 ENCOUNTER — APPOINTMENT (OUTPATIENT)
Dept: FAMILY MEDICINE | Facility: CLINIC | Age: 51
End: 2021-06-07

## 2021-06-07 DIAGNOSIS — B94.8 SEQUELAE OF OTHER SPECIFIED INFECTIOUS AND PARASITIC DISEASES: ICD-10-CM

## 2021-06-07 DIAGNOSIS — I26.99 OTHER PULMONARY EMBOLISM WITHOUT ACUTE COR PULMONALE: ICD-10-CM

## 2021-06-07 PROCEDURE — 71260 CT THORAX DX C+: CPT | Mod: 26

## 2021-06-07 PROCEDURE — 71260 CT THORAX DX C+: CPT

## 2021-06-25 ENCOUNTER — APPOINTMENT (OUTPATIENT)
Dept: CARDIOLOGY | Facility: CLINIC | Age: 51
End: 2021-06-25
Payer: MEDICAID

## 2021-06-25 PROCEDURE — 99072 ADDL SUPL MATRL&STAF TM PHE: CPT

## 2021-06-25 PROCEDURE — 93306 TTE W/DOPPLER COMPLETE: CPT

## 2021-07-08 ENCOUNTER — RX RENEWAL (OUTPATIENT)
Age: 51
End: 2021-07-08

## 2021-07-08 RX ORDER — ROSUVASTATIN CALCIUM 20 MG/1
20 TABLET, FILM COATED ORAL
Qty: 14 | Refills: 0 | Status: ACTIVE | COMMUNITY
Start: 2021-03-11 | End: 1900-01-01

## 2021-07-29 ENCOUNTER — TRANSCRIPTION ENCOUNTER (OUTPATIENT)
Age: 51
End: 2021-07-29

## 2022-03-06 NOTE — PROGRESS NOTE ADULT - ASSESSMENT
49 y/o obese M with no known PMH presented to the ED with fever, shortness of breath and fatigue.  Found to be hypoxic, saturating 68% on room air, placed on BIPAP in ED.  CXR showed diffuse multifocal infiltrates.  Patient reports having symptoms for several days, worsening the day of presentation.  Admitted to MICU for COVID 19 PNA and CT showed B/L PE. Pt on full dose anticoagulation and remedesivir. Pt on High flow oxygen. Pt downgraded to medical service for further management.     #Acute Hypoxic respiratory failure   - 2/2 covid 19 pna  - complicated by acute pe - lovenox w/ coumadin bridge- goal inr 2-3 (patient with no insurance)  - w/ transaminitis- probable 2/2 covid   - ID consult appreciated   - s/p remdesivir  - decadron   - ID consult appreciated   - o2 prn - wean as tolerated   - supportive care     #obesity  - weight loss counselled     #type 2 diabetes  - a1c 7.4 on admit   - iss w/ lantus  - monitor fingersticks     patient does not want me to call family at this time 
49 y/o obese M with no known PMH presented to the ED with fever, shortness of breath and fatigue.  Found to be hypoxic, saturating 68% on room air, placed on BIPAP in ED.  CXR showed diffuse multifocal infiltrates.  Patient reports having symptoms for several days, worsening the day of presentation.  Admitted to MICU for COVID 19 PNA and CT showed B/L PE. Pt on full dose anticoagulation and remedesivir. Pt on High flow oxygen. Pt downgraded to medical service for further management.     Acute Hypoxic respiratory failure due to COVID19 PNA and Acute PE  - ID consult appreciated  - c/w Remdesivir  - c/w Decadron  - ID following  -   - saturating 95% on HFNC    Acute Bilateral Pulmonary Embolism  - c/w Lovenox 100mg q12h  - transition to Eliquis on discharge    Steroid induced hyperglycemia  - FS with sliding scale  - Lantus 20 units nightly    DVT ppx  - Lovenox     Dispo: patient is medically acute  
49 yo obese male, without known PMH, presented to the ED with fever, shortness of breath and fatigue.  Found to be hypoxic, saturating 68% on room air, placed on BIPAP in ED.  CXR showed diffuse multifocal infiltrates.  Patient reports having symptoms for several days, worsening the day of presentation.  Admitted to MICU for COVID 19 PNA and CT showed B/L PE. Pt on full dose anticoagulation and remedesivir. Pt on High flow oxygen. Pt downgraded to medical service.      1) Acute Hypoxic respiratory failure due to COVID19 PNA and Acute PE  - on remdesivir  - dexamethasone  - O2, high flow, pulse ox  - ID following    2) Acute Bilateral Pulmonary embolism  - Full dose Lovenox  - transition to Eliquis on discharge    3) Hyperglycemia - steroid induced  - lantus and CSI with meals    
50y  Male with no PMH who presented to the ED with fever, shortness of breath and fatigue. Found to be hypoxic, saturating 68% on room air, placed on BIPAP in ED.  CXR showed diffuse multifocal infiltrates.  Patient reports having symptoms for several days, worsening the day of presentation. Patient with unknown sick contacts. Admitted to MICU. COVID 19 positive.        Impression:  COVID-19 infection   Viral pneumonia  shortness of breath  lung infiltrates  dependence on oxygen    Plan:  - completed Remdesivir course.    - remains on  dexamethasone ; doing better    - Continue supportive care measures  - continue Oxygenation as needed;  CONTINUE to titrate down as tolerated  - self- proning  as tolerated  - ENCOURAGED OOB to chair  - encouraged incentive spirometry       continue to trend inflammatory markers.   If procalcitonin starts to rise, may need to consider treating for secondary bacterial infections    - trend CBC with diff, CMP with LFT's  - trend Inflammatory markers (ESR, CRP, Ferritin, LDH,  procalcitonin)  q48h.  D dimer, lactate, troponin, CK, PT/PTT x 1    IF continues to improve, can send home earlier.  LIKELY with oxygen    No further specific infectious disease recommendations. Will be available as needed.  
50y  Male with no PMH who presented to the ED with fever, shortness of breath and fatigue. Found to be hypoxic, saturating 68% on room air, placed on BIPAP in ED.  CXR showed diffuse multifocal infiltrates.  Patient reports having symptoms for several days, worsening the day of presentation. Patient with unknown sick contacts. Admitted to MICU. COVID 19 positive.      Acute Hypoxic respiratory failure  COVID-19 infection  B/L Pneumonia   cough  fevers  shortness of breath      - COVID 19 PCR positive   - CXR reporting multifocal pneumonia   - Continue supportive care measures  - continue to trend inflammatory markers.   - trend CBC with diff, CMP,  CRP, Ferritin,  procalcitonin q 48hours  - Avoid antibiotics unless there is a concern for a bacterial infection       - Continue Remdesivir IV daily   THRU 12/31/2020    - Dexamethasone 6mg  Daily       Please call us back if a 10 day course of Remdesivir is requested.      
50y  Male with no PMH who presented to the ED with fever, shortness of breath and fatigue. Found to be hypoxic, saturating 68% on room air, placed on BIPAP in ED.  CXR showed diffuse multifocal infiltrates.  Patient reports having symptoms for several days, worsening the day of presentation. Patient with unknown sick contacts. Admitted to MICU. COVID 19 positive.      Acute Hypoxic respiratory failure  COVID-19 infection  B/L Pneumonia   cough  fevers  shortness of breath      - COVID 19 PCR positive   - CXR reporting multifocal pneumonia   - Continue supportive care measures  - continue to trend inflammatory markers.   - trend CBC with diff, CMP,  CRP, Ferritin,  procalcitonin q 48hours  - Avoid antibiotics unless there is a concern for a bacterial infection  - May consider vitamin C, thiamine and zinc (note lack of evidence to support benefit with COVID 19)  - Discussed Remdesivir with the patient.   - Continue Remdesivir 200mg IV x1 followed by 100mg IV daily   - Dexamethasone 6mg  Daily   - Patient needs to quarantine  for 14 days after discharge   - follow up all outstanding cultures  - Trend Fever  - Trend Leukocytosis      Will follow    
51 y/o obese M with no known PMH presented to the ED with fever, shortness of breath and fatigue.  Found to be hypoxic, saturating 68% on room air, placed on BIPAP in ED.  CXR showed diffuse multifocal infiltrates.  Patient reports having symptoms for several days, worsening the day of presentation.  Admitted to MICU for COVID 19 PNA and CT showed B/L PE. Pt on full dose anticoagulation and remedesivir. Pt on High flow oxygen. Pt downgraded to medical service for further management.     Acute Hypoxic respiratory failure due to COVID19 PNA and Acute PE  - ID consult appreciated  - last day of Remdesivir today  - c/w Decadron 6mg daily  - ID following  -   - currently saturating 93% on HFNC    Acute Bilateral Pulmonary Embolism  - c/w Lovenox 100mg q12h  - transition to Eliquis on discharge    Steroid induced hyperglycemia  - FS with sliding scale  - Lantus 20 units nightly    DVT ppx  - Lovenox 100mg q12h    Dispo: patient is medically acute
Interval events: off bipap, on high flow now at 60%    Exam  comfortable on high flow, drinking water  reg rhythm  bilat air entry  abd soft  bilat trace edema  good capillary refill    Impression:  51 yo male with hx of obesity, admitted with acute respiratory failure requiring bipap due to COVID19 viral pneumonia, found to have PE.    Plan:  Acute respiratory failure/ COVID19  - remdesivir  - dexamethasone  - transitioned to high flow NC, doing well  Will eventually need outpatient sleep study, at high risk of GURPREET    Pulmonary embolism  - Full dose lovenox  - transition to eliquis on discharge    Hyperglycemia - steroid induced  - start lantus, lispro  - check a1c    Diet: start clears, advance to reg diet later today or tomorrow if remains off bipap  will downgrade from micu
49 y/o obese M with no known PMH presented to the ED with fever, shortness of breath and fatigue.  Found to be hypoxic, saturating 68% on room air, placed on BIPAP in ED.  CXR showed diffuse multifocal infiltrates.  Patient reports having symptoms for several days, worsening the day of presentation.  Admitted to MICU for COVID 19 PNA and CT showed B/L PE. Pt on full dose anticoagulation and remedesivir. Pt on High flow oxygen. Pt downgraded to medical service for further management.     #Acute Hypoxic respiratory failure   - 2/2 covid 19 pna  - complicated by acute pe - lovenox (would recommend noac however patient without insurance so will start coumadin bridge once inr results goal inr 2-3)  - w/ transaminitis- probable 2/2 covid   - ID consult appreciated   - s/p remdesivir  - decadron   - ID consult appreciated   - o2 prn - wean as tolerated   - supportive care     #obesity  - weight loss counselled     #type 2 diabetes  - a1c 7.4 on admit   - iss w/ lantus  - monitor fingersticks     patient does not want me to call family at this time 
49 y/o obese M with no known PMH presented to the ED with fever, shortness of breath and fatigue.  Found to be hypoxic, saturating 68% on room air, placed on BIPAP in ED.  CXR showed diffuse multifocal infiltrates.  Patient reports having symptoms for several days, worsening the day of presentation.  Admitted to MICU for COVID 19 PNA and CT showed B/L PE. Pt on full dose anticoagulation and remedesivir. Pt on High flow oxygen. Pt downgraded to medical service for further management.     Acute Hypoxic respiratory failure due to COVID19 PNA and Acute PE  - ID consult appreciated  - s/p Remdesivir  - c/w Decadron 6mg daily  - ID following  -   - currently saturating 98% on HFNC    Acute Bilateral Pulmonary Embolism  - c/w Lovenox 100mg q12h  - transition to Eliquis on discharge    Steroid induced hyperglycemia  - FS with sliding scale  - Lantus 20 units nightly    DVT ppx  - Lovenox 100mg q12h    Dispo: patient is medically acute
49 y/o obese puentes with no known PMH presented to the ED with fever, shortness of breath and fatigue.  Found to be hypoxic, saturating 68% on room air, placed on BIPAP in ED.  CXR showed diffuse multifocal infiltrates.  Patient reports having symptoms for several days, worsening the day of presentation.  Admitted to MICU for COVID 19 PNA and CT showed B/L PE. Pt on full dose anticoagulation and remedesivir. Pt on High flow oxygen. Pt downgraded to medical service for further management. Patient currently on 2 LNC O2 sats 94 %, attempt trial of RA and monitoring of SPO2     #Acute Hypoxic respiratory failure   - 2/2 covid 19 pna  - complicated by acute pe - lovenox w/ coumadin bridge- goal inr 2-3 (patient with no insurance)  - w/ transaminitis- probable 2/2 covid   - Followed by ID  - s/p remdesivir  - decadron   - ID consult appreciated   - O2 prn - wean as tolerated   - supportive care     #obesity  - weight loss counselled     #type 2 diabetes  - a1c 7.4 on admit   - ISS w/ Lantus  - monitor fingersticks     #loose stools  -Imodium 2 mg after loose stools  -If stools become watery, consider sending stool culture        
50y  Male with no PMH who presented to the ED with fever, shortness of breath and fatigue. Found to be hypoxic, saturating 68% on room air, placed on BIPAP in ED.  CXR showed diffuse multifocal infiltrates.  Patient reports having symptoms for several days, worsening the day of presentation. Patient with unknown sick contacts. Admitted to MICU. COVID 19 positive.        Impression:  COVID-19 infection   Viral pneumonia  shortness of breath  lung infiltrates  dependence on oxygen    Plan:  - completed Remdesivir course.    - remains on  dexamethasone ; doing better      - Continue supportive care measures  - continue Oxygenation as needed;  CONTINUE to titrate down as tolerated  - self- proning  as tolerated  - ENCOURAGED OOB to chair  - encouraged incentive spirometry       continue to trend inflammatory markers.   If procalcitonin starts to rise, may need to consider treating for secondary bacterial infections    - trend CBC with diff, CMP with LFT's  - trend Inflammatory markers (ESR, CRP, Ferritin, LDH,  procalcitonin)  q48h.  D dimer, lactate, troponin, CK, PT/PTT x 1        IF continues to improve, can send home earlier. 
49 y/o obese M with no known PMH presented to the ED with fever, shortness of breath and fatigue.  Found to be hypoxic, saturating 68% on room air, placed on BIPAP in ED.  CXR showed diffuse multifocal infiltrates.  Patient reports having symptoms for several days, worsening the day of presentation.  Admitted to MICU for COVID 19 PNA and CT showed B/L PE. Pt on full dose anticoagulation and remedesivir. Pt on High flow oxygen. Pt downgraded to medical service for further management.     #Acute Hypoxic respiratory failure   - 2/2 covid 19 pna  - complicated by acute pe - lovenox (transition to eliquis on d/c)  - w/ transaminitis- probable 2/2 covid   - ID consult appreciated   - s/p remdesivir  - decadron   - ID consult appreciated   - o2 as needed currently on high flow  - supportive care     #obesity  - weight loss counselled     #type 2 diabetes  - a1c 7.4 on admit   - iss w/ lantus  - monitor fingersticks     patient does not want me to call family at this time 
49 y/o obese M with no known PMH presented to the ED with fever, shortness of breath and fatigue.  Found to be hypoxic, saturating 68% on room air, placed on BIPAP in ED.  CXR showed diffuse multifocal infiltrates.  Patient reports having symptoms for several days, worsening the day of presentation.  Admitted to MICU for COVID 19 PNA and CT showed B/L PE. Pt on full dose anticoagulation and remedesivir. Pt on High flow oxygen. Pt downgraded to medical service for further management.     #Acute Hypoxic respiratory failure   - 2/2 covid 19 pna  - complicated by acute pe- eliquis given patient now with insurance   - w/ transaminitis- probable 2/2 covid   - ID consult appreciated   - s/p remdesivir  - decadron   - ID consult appreciated   - o2 prn - wean as tolerated   - supportive care     #obesity  - weight loss counselled     #type 2 diabetes  - a1c 7.4 on admit   - iss w/ lantus  - monitor fingersticks     patient does not want me to call family at this time     DISPO- PENDING HOME 02
51 yo obese male, without known PMH, presented to the ED with fever, shortness of breath and fatigue, found with COVID-19 PNA requiring NIPPV    Impression:  - Hypoxic respiratory failure  - COVID-19 PNA  - Obesity  - LE dopplers with slow viscous blood flow - VTE?    Plan:  - Wean off BPAP to HFNC; c/w PRN + QHS  - Maintain SpO2 >=88%  - C/w Decadron, Remdesivir  - Plan for CTA today to evaluate for PE  - LE dopplers noted with slow viscous blood flow  - On FD Lovenox empirically; D-dimer >50,000  - Trend inflammatory markers Q48H  - ID on board, recommendations appreciated  - C/w care in ICU    Ajit Tay M.D.  Pulmonary & Critical Care Medicine  [facility]  [facility] [place]  [address]  [place] [address]  T: [phone]  F: [phone]
Home

## 2022-05-16 ENCOUNTER — APPOINTMENT (OUTPATIENT)
Dept: CARDIOLOGY | Facility: CLINIC | Age: 52
End: 2022-05-16